# Patient Record
Sex: MALE | HISPANIC OR LATINO | Employment: FULL TIME | URBAN - METROPOLITAN AREA
[De-identification: names, ages, dates, MRNs, and addresses within clinical notes are randomized per-mention and may not be internally consistent; named-entity substitution may affect disease eponyms.]

---

## 2018-02-25 ENCOUNTER — HOSPITAL ENCOUNTER (INPATIENT)
Facility: MEDICAL CENTER | Age: 58
LOS: 2 days | DRG: 247 | End: 2018-02-27
Attending: EMERGENCY MEDICINE | Admitting: INTERNAL MEDICINE

## 2018-02-25 ENCOUNTER — APPOINTMENT (OUTPATIENT)
Dept: RADIOLOGY | Facility: MEDICAL CENTER | Age: 58
DRG: 247 | End: 2018-02-25
Attending: EMERGENCY MEDICINE

## 2018-02-25 ENCOUNTER — RESOLUTE PROFESSIONAL BILLING HOSPITAL PROF FEE (OUTPATIENT)
Dept: HOSPITALIST | Facility: MEDICAL CENTER | Age: 58
End: 2018-02-25

## 2018-02-25 DIAGNOSIS — I24.9 ACUTE CORONARY SYNDROME (HCC): ICD-10-CM

## 2018-02-25 PROBLEM — I21.4 NSTEMI (NON-ST ELEVATED MYOCARDIAL INFARCTION) (HCC): Status: ACTIVE | Noted: 2018-02-25

## 2018-02-25 LAB
ALBUMIN SERPL BCP-MCNC: 4.5 G/DL (ref 3.2–4.9)
ALBUMIN/GLOB SERPL: 1.7 G/DL
ALP SERPL-CCNC: 75 U/L (ref 30–99)
ALT SERPL-CCNC: 16 U/L (ref 2–50)
ANION GAP SERPL CALC-SCNC: 10 MMOL/L (ref 0–11.9)
APTT PPP: 25.2 SEC (ref 24.7–36)
AST SERPL-CCNC: 18 U/L (ref 12–45)
BASOPHILS # BLD AUTO: 0.5 % (ref 0–1.8)
BASOPHILS # BLD: 0.04 K/UL (ref 0–0.12)
BILIRUB SERPL-MCNC: 0.6 MG/DL (ref 0.1–1.5)
BNP SERPL-MCNC: 50 PG/ML (ref 0–100)
BUN SERPL-MCNC: 18 MG/DL (ref 8–22)
CALCIUM SERPL-MCNC: 9.4 MG/DL (ref 8.5–10.5)
CHLORIDE SERPL-SCNC: 105 MMOL/L (ref 96–112)
CO2 SERPL-SCNC: 23 MMOL/L (ref 20–33)
CREAT SERPL-MCNC: 1.26 MG/DL (ref 0.5–1.4)
EKG IMPRESSION: NORMAL
EKG IMPRESSION: NORMAL
EOSINOPHIL # BLD AUTO: 0.27 K/UL (ref 0–0.51)
EOSINOPHIL NFR BLD: 3.7 % (ref 0–6.9)
ERYTHROCYTE [DISTWIDTH] IN BLOOD BY AUTOMATED COUNT: 40 FL (ref 35.9–50)
GLOBULIN SER CALC-MCNC: 2.6 G/DL (ref 1.9–3.5)
GLUCOSE BLD-MCNC: 121 MG/DL (ref 65–99)
GLUCOSE SERPL-MCNC: 170 MG/DL (ref 65–99)
HCT VFR BLD AUTO: 47.2 % (ref 42–52)
HGB BLD-MCNC: 16.8 G/DL (ref 14–18)
IMM GRANULOCYTES # BLD AUTO: 0.06 K/UL (ref 0–0.11)
IMM GRANULOCYTES NFR BLD AUTO: 0.8 % (ref 0–0.9)
INR PPP: 0.95 (ref 0.87–1.13)
LIPASE SERPL-CCNC: 31 U/L (ref 11–82)
LYMPHOCYTES # BLD AUTO: 2.19 K/UL (ref 1–4.8)
LYMPHOCYTES NFR BLD: 29.6 % (ref 22–41)
MCH RBC QN AUTO: 29.5 PG (ref 27–33)
MCHC RBC AUTO-ENTMCNC: 35.6 G/DL (ref 33.7–35.3)
MCV RBC AUTO: 83 FL (ref 81.4–97.8)
MONOCYTES # BLD AUTO: 0.67 K/UL (ref 0–0.85)
MONOCYTES NFR BLD AUTO: 9.1 % (ref 0–13.4)
NEUTROPHILS # BLD AUTO: 4.16 K/UL (ref 1.82–7.42)
NEUTROPHILS NFR BLD: 56.3 % (ref 44–72)
NRBC # BLD AUTO: 0 K/UL
NRBC BLD-RTO: 0 /100 WBC
PLATELET # BLD AUTO: 274 K/UL (ref 164–446)
PMV BLD AUTO: 9.5 FL (ref 9–12.9)
POTASSIUM SERPL-SCNC: 3.6 MMOL/L (ref 3.6–5.5)
PROT SERPL-MCNC: 7.1 G/DL (ref 6–8.2)
PROTHROMBIN TIME: 12.4 SEC (ref 12–14.6)
RBC # BLD AUTO: 5.69 M/UL (ref 4.7–6.1)
SODIUM SERPL-SCNC: 138 MMOL/L (ref 135–145)
TROPONIN I SERPL-MCNC: 0.55 NG/ML (ref 0–0.04)
WBC # BLD AUTO: 7.4 K/UL (ref 4.8–10.8)

## 2018-02-25 PROCEDURE — 83690 ASSAY OF LIPASE: CPT

## 2018-02-25 PROCEDURE — 36415 COLL VENOUS BLD VENIPUNCTURE: CPT

## 2018-02-25 PROCEDURE — 85610 PROTHROMBIN TIME: CPT

## 2018-02-25 PROCEDURE — 71045 X-RAY EXAM CHEST 1 VIEW: CPT

## 2018-02-25 PROCEDURE — A9270 NON-COVERED ITEM OR SERVICE: HCPCS | Performed by: INTERNAL MEDICINE

## 2018-02-25 PROCEDURE — 99223 1ST HOSP IP/OBS HIGH 75: CPT | Performed by: INTERNAL MEDICINE

## 2018-02-25 PROCEDURE — 83880 ASSAY OF NATRIURETIC PEPTIDE: CPT

## 2018-02-25 PROCEDURE — 700111 HCHG RX REV CODE 636 W/ 250 OVERRIDE (IP): Performed by: EMERGENCY MEDICINE

## 2018-02-25 PROCEDURE — 700111 HCHG RX REV CODE 636 W/ 250 OVERRIDE (IP): Performed by: INTERNAL MEDICINE

## 2018-02-25 PROCEDURE — 700102 HCHG RX REV CODE 250 W/ 637 OVERRIDE(OP): Performed by: INTERNAL MEDICINE

## 2018-02-25 PROCEDURE — 84484 ASSAY OF TROPONIN QUANT: CPT

## 2018-02-25 PROCEDURE — 85025 COMPLETE CBC W/AUTO DIFF WBC: CPT

## 2018-02-25 PROCEDURE — A9270 NON-COVERED ITEM OR SERVICE: HCPCS | Performed by: EMERGENCY MEDICINE

## 2018-02-25 PROCEDURE — 93010 ELECTROCARDIOGRAM REPORT: CPT | Performed by: INTERNAL MEDICINE

## 2018-02-25 PROCEDURE — 82962 GLUCOSE BLOOD TEST: CPT

## 2018-02-25 PROCEDURE — 770020 HCHG ROOM/CARE - TELE (206)

## 2018-02-25 PROCEDURE — 93005 ELECTROCARDIOGRAM TRACING: CPT | Performed by: EMERGENCY MEDICINE

## 2018-02-25 PROCEDURE — 85730 THROMBOPLASTIN TIME PARTIAL: CPT

## 2018-02-25 PROCEDURE — 700102 HCHG RX REV CODE 250 W/ 637 OVERRIDE(OP): Performed by: EMERGENCY MEDICINE

## 2018-02-25 PROCEDURE — 99285 EMERGENCY DEPT VISIT HI MDM: CPT

## 2018-02-25 PROCEDURE — 96375 TX/PRO/DX INJ NEW DRUG ADDON: CPT

## 2018-02-25 PROCEDURE — 93005 ELECTROCARDIOGRAM TRACING: CPT

## 2018-02-25 PROCEDURE — 94760 N-INVAS EAR/PLS OXIMETRY 1: CPT

## 2018-02-25 PROCEDURE — 700105 HCHG RX REV CODE 258: Performed by: INTERNAL MEDICINE

## 2018-02-25 PROCEDURE — 83036 HEMOGLOBIN GLYCOSYLATED A1C: CPT

## 2018-02-25 PROCEDURE — 80053 COMPREHEN METABOLIC PANEL: CPT

## 2018-02-25 PROCEDURE — 93005 ELECTROCARDIOGRAM TRACING: CPT | Performed by: INTERNAL MEDICINE

## 2018-02-25 PROCEDURE — 96365 THER/PROPH/DIAG IV INF INIT: CPT

## 2018-02-25 RX ORDER — HEPARIN SODIUM 1000 [USP'U]/ML
2600 INJECTION, SOLUTION INTRAVENOUS; SUBCUTANEOUS PRN
Status: DISCONTINUED | OUTPATIENT
Start: 2018-02-25 | End: 2018-02-25

## 2018-02-25 RX ORDER — SODIUM CHLORIDE 9 MG/ML
INJECTION, SOLUTION INTRAVENOUS CONTINUOUS
Status: DISCONTINUED | OUTPATIENT
Start: 2018-02-25 | End: 2018-02-25

## 2018-02-25 RX ORDER — ASPIRIN 300 MG/1
300 SUPPOSITORY RECTAL ONCE
Status: COMPLETED | OUTPATIENT
Start: 2018-02-25 | End: 2018-02-25

## 2018-02-25 RX ORDER — ASPIRIN 325 MG
325 TABLET ORAL DAILY
Status: DISCONTINUED | OUTPATIENT
Start: 2018-02-26 | End: 2018-02-27

## 2018-02-25 RX ORDER — DEXTROSE MONOHYDRATE 25 G/50ML
25 INJECTION, SOLUTION INTRAVENOUS
Status: DISCONTINUED | OUTPATIENT
Start: 2018-02-25 | End: 2018-02-27 | Stop reason: HOSPADM

## 2018-02-25 RX ORDER — ACETAMINOPHEN 325 MG/1
650 TABLET ORAL EVERY 6 HOURS PRN
Status: DISCONTINUED | OUTPATIENT
Start: 2018-02-25 | End: 2018-02-27 | Stop reason: HOSPADM

## 2018-02-25 RX ORDER — ASPIRIN 81 MG/1
324 TABLET, CHEWABLE ORAL ONCE
Status: COMPLETED | OUTPATIENT
Start: 2018-02-25 | End: 2018-02-25

## 2018-02-25 RX ORDER — ASPIRIN 300 MG/1
300 SUPPOSITORY RECTAL DAILY
Status: DISCONTINUED | OUTPATIENT
Start: 2018-02-26 | End: 2018-02-27

## 2018-02-25 RX ORDER — ONDANSETRON 4 MG/1
4 TABLET, ORALLY DISINTEGRATING ORAL EVERY 4 HOURS PRN
Status: DISCONTINUED | OUTPATIENT
Start: 2018-02-25 | End: 2018-02-27 | Stop reason: HOSPADM

## 2018-02-25 RX ORDER — AMOXICILLIN 250 MG
2 CAPSULE ORAL 2 TIMES DAILY
Status: DISCONTINUED | OUTPATIENT
Start: 2018-02-25 | End: 2018-02-27 | Stop reason: HOSPADM

## 2018-02-25 RX ORDER — NITROGLYCERIN 0.4 MG/1
0.4 TABLET SUBLINGUAL
Status: DISCONTINUED | OUTPATIENT
Start: 2018-02-25 | End: 2018-02-27 | Stop reason: HOSPADM

## 2018-02-25 RX ORDER — LABETALOL HYDROCHLORIDE 5 MG/ML
10 INJECTION, SOLUTION INTRAVENOUS EVERY 4 HOURS PRN
Status: DISCONTINUED | OUTPATIENT
Start: 2018-02-25 | End: 2018-02-27 | Stop reason: HOSPADM

## 2018-02-25 RX ORDER — BISACODYL 10 MG
10 SUPPOSITORY, RECTAL RECTAL
Status: DISCONTINUED | OUTPATIENT
Start: 2018-02-25 | End: 2018-02-27 | Stop reason: HOSPADM

## 2018-02-25 RX ORDER — HEPARIN SODIUM 1000 [USP'U]/ML
2600 INJECTION, SOLUTION INTRAVENOUS; SUBCUTANEOUS PRN
Status: DISCONTINUED | OUTPATIENT
Start: 2018-02-25 | End: 2018-02-26

## 2018-02-25 RX ORDER — HEPARIN SODIUM 1000 [USP'U]/ML
5000 INJECTION, SOLUTION INTRAVENOUS; SUBCUTANEOUS ONCE
Status: COMPLETED | OUTPATIENT
Start: 2018-02-25 | End: 2018-02-25

## 2018-02-25 RX ORDER — ATORVASTATIN CALCIUM 80 MG/1
80 TABLET, FILM COATED ORAL
Status: DISCONTINUED | OUTPATIENT
Start: 2018-02-25 | End: 2018-02-27 | Stop reason: HOSPADM

## 2018-02-25 RX ORDER — SODIUM CHLORIDE 9 MG/ML
INJECTION, SOLUTION INTRAVENOUS CONTINUOUS
Status: DISCONTINUED | OUTPATIENT
Start: 2018-02-25 | End: 2018-02-26

## 2018-02-25 RX ORDER — ONDANSETRON 2 MG/ML
4 INJECTION INTRAMUSCULAR; INTRAVENOUS EVERY 4 HOURS PRN
Status: DISCONTINUED | OUTPATIENT
Start: 2018-02-25 | End: 2018-02-27 | Stop reason: HOSPADM

## 2018-02-25 RX ORDER — ATORVASTATIN CALCIUM 20 MG/1
80 TABLET, FILM COATED ORAL EVERY EVENING
Status: DISCONTINUED | OUTPATIENT
Start: 2018-02-25 | End: 2018-02-25

## 2018-02-25 RX ORDER — LISINOPRIL 5 MG/1
5 TABLET ORAL
Status: DISCONTINUED | OUTPATIENT
Start: 2018-02-25 | End: 2018-02-27 | Stop reason: HOSPADM

## 2018-02-25 RX ORDER — POLYETHYLENE GLYCOL 3350 17 G/17G
1 POWDER, FOR SOLUTION ORAL
Status: DISCONTINUED | OUTPATIENT
Start: 2018-02-25 | End: 2018-02-27 | Stop reason: HOSPADM

## 2018-02-25 RX ORDER — ASPIRIN 81 MG/1
324 TABLET, CHEWABLE ORAL DAILY
Status: DISCONTINUED | OUTPATIENT
Start: 2018-02-26 | End: 2018-02-27

## 2018-02-25 RX ORDER — MORPHINE SULFATE 4 MG/ML
2-4 INJECTION, SOLUTION INTRAMUSCULAR; INTRAVENOUS
Status: DISCONTINUED | OUTPATIENT
Start: 2018-02-25 | End: 2018-02-27 | Stop reason: HOSPADM

## 2018-02-25 RX ADMIN — METOPROLOL TARTRATE 25 MG: 25 TABLET, FILM COATED ORAL at 19:14

## 2018-02-25 RX ADMIN — ASPIRIN 324 MG: 81 TABLET, CHEWABLE ORAL at 16:58

## 2018-02-25 RX ADMIN — STANDARDIZED SENNA CONCENTRATE AND DOCUSATE SODIUM 2 TABLET: 8.6; 5 TABLET, FILM COATED ORAL at 21:57

## 2018-02-25 RX ADMIN — HEPARIN SODIUM 1050 UNITS: 5000 INJECTION, SOLUTION INTRAVENOUS at 17:13

## 2018-02-25 RX ADMIN — HEPARIN SODIUM 5000 UNITS: 1000 INJECTION, SOLUTION INTRAVENOUS; SUBCUTANEOUS at 17:13

## 2018-02-25 RX ADMIN — LISINOPRIL 5 MG: 5 TABLET ORAL at 19:14

## 2018-02-25 RX ADMIN — HEPARIN SODIUM 25000 UNITS: 5000 INJECTION, SOLUTION INTRAVENOUS at 19:16

## 2018-02-25 RX ADMIN — ATORVASTATIN CALCIUM 80 MG: 80 TABLET, FILM COATED ORAL at 21:57

## 2018-02-25 RX ADMIN — NITROGLYCERIN 1 INCH: 20 OINTMENT TOPICAL at 17:00

## 2018-02-25 ASSESSMENT — LIFESTYLE VARIABLES
AVERAGE NUMBER OF DAYS PER WEEK YOU HAVE A DRINK CONTAINING ALCOHOL: 2
TOTAL SCORE: 0
ON A TYPICAL DAY WHEN YOU DRINK ALCOHOL HOW MANY DRINKS DO YOU HAVE: 6
EVER HAD A DRINK FIRST THING IN THE MORNING TO STEADY YOUR NERVES TO GET RID OF A HANGOVER: NO
HAVE PEOPLE ANNOYED YOU BY CRITICIZING YOUR DRINKING: NO
HAVE YOU EVER FELT YOU SHOULD CUT DOWN ON YOUR DRINKING: NO
TOTAL SCORE: 0
TOTAL SCORE: 0
EVER_SMOKED: UNABLE TO EVALUATE AT THIS TIME - NEEDS ASSESSMENT PRIOR TO DISCHARGE
CONSUMPTION TOTAL: INCOMPLETE
EVER FELT BAD OR GUILTY ABOUT YOUR DRINKING: NO
EVER_SMOKED: NEVER
ALCOHOL_USE: YES

## 2018-02-25 ASSESSMENT — PAIN SCALES - GENERAL: PAINLEVEL_OUTOF10: 0

## 2018-02-25 ASSESSMENT — COPD QUESTIONNAIRES
HAVE YOU SMOKED AT LEAST 100 CIGARETTES IN YOUR ENTIRE LIFE: NO/DON'T KNOW
DURING THE PAST 4 WEEKS HOW MUCH DID YOU FEEL SHORT OF BREATH: NONE/LITTLE OF THE TIME
COPD SCREENING SCORE: 1
DO YOU EVER COUGH UP ANY MUCUS OR PHLEGM?: NO/ONLY WITH OCCASIONAL COLDS OR INFECTIONS

## 2018-02-25 ASSESSMENT — PATIENT HEALTH QUESTIONNAIRE - PHQ9
1. LITTLE INTEREST OR PLEASURE IN DOING THINGS: NOT AT ALL
6. FEELING BAD ABOUT YOURSELF - OR THAT YOU ARE A FAILURE OR HAVE LET YOURSELF OR YOUR FAMILY DOWN: NOT AL ALL
5. POOR APPETITE OR OVEREATING: NOT AT ALL
SUM OF ALL RESPONSES TO PHQ9 QUESTIONS 1 AND 2: 0
9. THOUGHTS THAT YOU WOULD BE BETTER OFF DEAD, OR OF HURTING YOURSELF: NOT AT ALL
4. FEELING TIRED OR HAVING LITTLE ENERGY: NOT AT ALL
SUM OF ALL RESPONSES TO PHQ QUESTIONS 1-9: 0
7. TROUBLE CONCENTRATING ON THINGS, SUCH AS READING THE NEWSPAPER OR WATCHING TELEVISION: NOT AT ALL
2. FEELING DOWN, DEPRESSED, IRRITABLE, OR HOPELESS: NOT AT ALL
3. TROUBLE FALLING OR STAYING ASLEEP OR SLEEPING TOO MUCH: NOT AT ALL
8. MOVING OR SPEAKING SO SLOWLY THAT OTHER PEOPLE COULD HAVE NOTICED. OR THE OPPOSITE, BEING SO FIGETY OR RESTLESS THAT YOU HAVE BEEN MOVING AROUND A LOT MORE THAN USUAL: NOT AT ALL

## 2018-02-26 ENCOUNTER — APPOINTMENT (OUTPATIENT)
Dept: RADIOLOGY | Facility: MEDICAL CENTER | Age: 58
DRG: 247 | End: 2018-02-26
Attending: INTERNAL MEDICINE

## 2018-02-26 PROBLEM — E11.9 DM2 (DIABETES MELLITUS, TYPE 2) (HCC): Status: ACTIVE | Noted: 2018-02-26

## 2018-02-26 PROBLEM — N28.9 RENAL INSUFFICIENCY: Status: ACTIVE | Noted: 2018-02-26

## 2018-02-26 PROBLEM — I10 HTN (HYPERTENSION): Status: ACTIVE | Noted: 2018-02-26

## 2018-02-26 PROBLEM — E78.5 HYPERLIPIDEMIA: Status: ACTIVE | Noted: 2018-02-26

## 2018-02-26 LAB
ACT BLD: 164 SEC (ref 74–137)
ACT BLD: 307 SEC (ref 74–137)
ALBUMIN SERPL BCP-MCNC: 4 G/DL (ref 3.2–4.9)
ALBUMIN/GLOB SERPL: 1.6 G/DL
ALP SERPL-CCNC: 75 U/L (ref 30–99)
ALT SERPL-CCNC: 17 U/L (ref 2–50)
AMPHET UR QL SCN: NEGATIVE
ANION GAP SERPL CALC-SCNC: 11 MMOL/L (ref 0–11.9)
APTT PPP: 49.3 SEC (ref 24.7–36)
APTT PPP: 55.7 SEC (ref 24.7–36)
APTT PPP: 87.5 SEC (ref 24.7–36)
AST SERPL-CCNC: 25 U/L (ref 12–45)
BARBITURATES UR QL SCN: NEGATIVE
BASOPHILS # BLD AUTO: 0.7 % (ref 0–1.8)
BASOPHILS # BLD: 0.05 K/UL (ref 0–0.12)
BENZODIAZ UR QL SCN: NEGATIVE
BILIRUB SERPL-MCNC: 0.6 MG/DL (ref 0.1–1.5)
BUN SERPL-MCNC: 17 MG/DL (ref 8–22)
BZE UR QL SCN: NEGATIVE
CALCIUM SERPL-MCNC: 8.9 MG/DL (ref 8.5–10.5)
CANNABINOIDS UR QL SCN: NEGATIVE
CHLORIDE SERPL-SCNC: 103 MMOL/L (ref 96–112)
CHOLEST SERPL-MCNC: 276 MG/DL (ref 100–199)
CO2 SERPL-SCNC: 24 MMOL/L (ref 20–33)
CREAT SERPL-MCNC: 0.83 MG/DL (ref 0.5–1.4)
EKG IMPRESSION: NORMAL
EKG IMPRESSION: NORMAL
EOSINOPHIL # BLD AUTO: 0.39 K/UL (ref 0–0.51)
EOSINOPHIL NFR BLD: 5.1 % (ref 0–6.9)
ERYTHROCYTE [DISTWIDTH] IN BLOOD BY AUTOMATED COUNT: 41.2 FL (ref 35.9–50)
EST. AVERAGE GLUCOSE BLD GHB EST-MCNC: 146 MG/DL
GLOBULIN SER CALC-MCNC: 2.5 G/DL (ref 1.9–3.5)
GLUCOSE BLD-MCNC: 137 MG/DL (ref 65–99)
GLUCOSE BLD-MCNC: 139 MG/DL (ref 65–99)
GLUCOSE BLD-MCNC: 145 MG/DL (ref 65–99)
GLUCOSE SERPL-MCNC: 145 MG/DL (ref 65–99)
HBA1C MFR BLD: 6.7 % (ref 0–5.6)
HCT VFR BLD AUTO: 44.7 % (ref 42–52)
HDLC SERPL-MCNC: 60 MG/DL
HGB BLD-MCNC: 15.5 G/DL (ref 14–18)
IMM GRANULOCYTES # BLD AUTO: 0.09 K/UL (ref 0–0.11)
IMM GRANULOCYTES NFR BLD AUTO: 1.2 % (ref 0–0.9)
INR PPP: 0.99 (ref 0.87–1.13)
LDLC SERPL CALC-MCNC: ABNORMAL MG/DL
LYMPHOCYTES # BLD AUTO: 2.84 K/UL (ref 1–4.8)
LYMPHOCYTES NFR BLD: 37.1 % (ref 22–41)
MAGNESIUM SERPL-MCNC: 2 MG/DL (ref 1.5–2.5)
MCH RBC QN AUTO: 29 PG (ref 27–33)
MCHC RBC AUTO-ENTMCNC: 34.7 G/DL (ref 33.7–35.3)
MCV RBC AUTO: 83.7 FL (ref 81.4–97.8)
METHADONE UR QL SCN: NEGATIVE
MONOCYTES # BLD AUTO: 0.73 K/UL (ref 0–0.85)
MONOCYTES NFR BLD AUTO: 9.5 % (ref 0–13.4)
NEUTROPHILS # BLD AUTO: 3.56 K/UL (ref 1.82–7.42)
NEUTROPHILS NFR BLD: 46.4 % (ref 44–72)
NRBC # BLD AUTO: 0 K/UL
NRBC BLD-RTO: 0 /100 WBC
OPIATES UR QL SCN: NEGATIVE
OXYCODONE UR QL SCN: NEGATIVE
PCP UR QL SCN: NEGATIVE
PHOSPHATE SERPL-MCNC: 3.6 MG/DL (ref 2.5–4.5)
PLATELET # BLD AUTO: 280 K/UL (ref 164–446)
PMV BLD AUTO: 9.2 FL (ref 9–12.9)
POTASSIUM SERPL-SCNC: 3.6 MMOL/L (ref 3.6–5.5)
PROPOXYPH UR QL SCN: NEGATIVE
PROT SERPL-MCNC: 6.5 G/DL (ref 6–8.2)
PROTHROMBIN TIME: 12.8 SEC (ref 12–14.6)
RBC # BLD AUTO: 5.34 M/UL (ref 4.7–6.1)
SODIUM SERPL-SCNC: 138 MMOL/L (ref 135–145)
TRIGL SERPL-MCNC: 457 MG/DL (ref 0–149)
TROPONIN I SERPL-MCNC: 3.06 NG/ML (ref 0–0.04)
TROPONIN I SERPL-MCNC: 3.06 NG/ML (ref 0–0.04)
TSH SERPL DL<=0.005 MIU/L-ACNC: 1.69 UIU/ML (ref 0.38–5.33)
WBC # BLD AUTO: 7.7 K/UL (ref 4.8–10.8)

## 2018-02-26 PROCEDURE — 700111 HCHG RX REV CODE 636 W/ 250 OVERRIDE (IP): Performed by: INTERNAL MEDICINE

## 2018-02-26 PROCEDURE — 85025 COMPLETE CBC W/AUTO DIFF WBC: CPT

## 2018-02-26 PROCEDURE — B2111ZZ FLUOROSCOPY OF MULTIPLE CORONARY ARTERIES USING LOW OSMOLAR CONTRAST: ICD-10-PCS | Performed by: INTERNAL MEDICINE

## 2018-02-26 PROCEDURE — 84484 ASSAY OF TROPONIN QUANT: CPT | Mod: 91

## 2018-02-26 PROCEDURE — 700117 HCHG RX CONTRAST REV CODE 255: Performed by: INTERNAL MEDICINE

## 2018-02-26 PROCEDURE — 80053 COMPREHEN METABOLIC PANEL: CPT

## 2018-02-26 PROCEDURE — 700102 HCHG RX REV CODE 250 W/ 637 OVERRIDE(OP)

## 2018-02-26 PROCEDURE — A9270 NON-COVERED ITEM OR SERVICE: HCPCS | Performed by: INTERNAL MEDICINE

## 2018-02-26 PROCEDURE — 93010 ELECTROCARDIOGRAM REPORT: CPT | Performed by: INTERNAL MEDICINE

## 2018-02-26 PROCEDURE — 82962 GLUCOSE BLOOD TEST: CPT

## 2018-02-26 PROCEDURE — 84443 ASSAY THYROID STIM HORMONE: CPT

## 2018-02-26 PROCEDURE — 360979 HCHG DIAGNOSTIC CATH

## 2018-02-26 PROCEDURE — 85610 PROTHROMBIN TIME: CPT

## 2018-02-26 PROCEDURE — B2151ZZ FLUOROSCOPY OF LEFT HEART USING LOW OSMOLAR CONTRAST: ICD-10-PCS | Performed by: INTERNAL MEDICINE

## 2018-02-26 PROCEDURE — C1725 CATH, TRANSLUMIN NON-LASER: HCPCS

## 2018-02-26 PROCEDURE — A9270 NON-COVERED ITEM OR SERVICE: HCPCS

## 2018-02-26 PROCEDURE — 80307 DRUG TEST PRSMV CHEM ANLYZR: CPT

## 2018-02-26 PROCEDURE — C9600 PERC DRUG-EL COR STENT SING: HCPCS | Mod: LD

## 2018-02-26 PROCEDURE — 304952 HCHG R 2 PADS

## 2018-02-26 PROCEDURE — 93458 L HRT ARTERY/VENTRICLE ANGIO: CPT

## 2018-02-26 PROCEDURE — C1874 STENT, COATED/COV W/DEL SYS: HCPCS

## 2018-02-26 PROCEDURE — 84100 ASSAY OF PHOSPHORUS: CPT

## 2018-02-26 PROCEDURE — 93306 TTE W/DOPPLER COMPLETE: CPT | Mod: 26 | Performed by: INTERNAL MEDICINE

## 2018-02-26 PROCEDURE — 36415 COLL VENOUS BLD VENIPUNCTURE: CPT

## 2018-02-26 PROCEDURE — C1894 INTRO/SHEATH, NON-LASER: HCPCS

## 2018-02-26 PROCEDURE — 307093 HCHG TR BAND RADIAL

## 2018-02-26 PROCEDURE — 99153 MOD SED SAME PHYS/QHP EA: CPT

## 2018-02-26 PROCEDURE — 85730 THROMBOPLASTIN TIME PARTIAL: CPT | Mod: 91

## 2018-02-26 PROCEDURE — 700102 HCHG RX REV CODE 250 W/ 637 OVERRIDE(OP): Performed by: INTERNAL MEDICINE

## 2018-02-26 PROCEDURE — C1887 CATHETER, GUIDING: HCPCS

## 2018-02-26 PROCEDURE — 700105 HCHG RX REV CODE 258: Performed by: INTERNAL MEDICINE

## 2018-02-26 PROCEDURE — 80061 LIPID PANEL: CPT

## 2018-02-26 PROCEDURE — 770020 HCHG ROOM/CARE - TELE (206)

## 2018-02-26 PROCEDURE — 83735 ASSAY OF MAGNESIUM: CPT

## 2018-02-26 PROCEDURE — 85347 COAGULATION TIME ACTIVATED: CPT | Mod: 91

## 2018-02-26 PROCEDURE — 93306 TTE W/DOPPLER COMPLETE: CPT

## 2018-02-26 PROCEDURE — 93005 ELECTROCARDIOGRAM TRACING: CPT | Performed by: INTERNAL MEDICINE

## 2018-02-26 PROCEDURE — 027034Z DILATION OF CORONARY ARTERY, ONE ARTERY WITH DRUG-ELUTING INTRALUMINAL DEVICE, PERCUTANEOUS APPROACH: ICD-10-PCS | Performed by: INTERNAL MEDICINE

## 2018-02-26 PROCEDURE — 99152 MOD SED SAME PHYS/QHP 5/>YRS: CPT

## 2018-02-26 PROCEDURE — 4A023N7 MEASUREMENT OF CARDIAC SAMPLING AND PRESSURE, LEFT HEART, PERCUTANEOUS APPROACH: ICD-10-PCS | Performed by: INTERNAL MEDICINE

## 2018-02-26 PROCEDURE — 700111 HCHG RX REV CODE 636 W/ 250 OVERRIDE (IP)

## 2018-02-26 PROCEDURE — 700101 HCHG RX REV CODE 250

## 2018-02-26 PROCEDURE — C1769 GUIDE WIRE: HCPCS

## 2018-02-26 PROCEDURE — 99233 SBSQ HOSP IP/OBS HIGH 50: CPT | Performed by: FAMILY MEDICINE

## 2018-02-26 PROCEDURE — 94760 N-INVAS EAR/PLS OXIMETRY 1: CPT

## 2018-02-26 RX ORDER — HEPARIN SODIUM,PORCINE 1000/ML
VIAL (ML) INJECTION
Status: COMPLETED
Start: 2018-02-26 | End: 2018-02-26

## 2018-02-26 RX ORDER — LIDOCAINE HYDROCHLORIDE 20 MG/ML
INJECTION, SOLUTION INFILTRATION; PERINEURAL
Status: COMPLETED
Start: 2018-02-26 | End: 2018-02-26

## 2018-02-26 RX ORDER — VERAPAMIL HYDROCHLORIDE 2.5 MG/ML
INJECTION, SOLUTION INTRAVENOUS
Status: COMPLETED
Start: 2018-02-26 | End: 2018-02-26

## 2018-02-26 RX ORDER — PRASUGREL 10 MG/1
60 TABLET, FILM COATED ORAL ONCE
Status: DISCONTINUED | OUTPATIENT
Start: 2018-02-26 | End: 2018-02-26

## 2018-02-26 RX ORDER — PRASUGREL 10 MG/1
10 TABLET, FILM COATED ORAL DAILY
Status: DISCONTINUED | OUTPATIENT
Start: 2018-02-27 | End: 2018-02-27

## 2018-02-26 RX ORDER — MIDAZOLAM HYDROCHLORIDE 1 MG/ML
INJECTION INTRAMUSCULAR; INTRAVENOUS
Status: COMPLETED
Start: 2018-02-26 | End: 2018-02-26

## 2018-02-26 RX ORDER — PRASUGREL 10 MG/1
TABLET, FILM COATED ORAL
Status: COMPLETED
Start: 2018-02-26 | End: 2018-02-26

## 2018-02-26 RX ORDER — SODIUM CHLORIDE 9 MG/ML
INJECTION, SOLUTION INTRAVENOUS CONTINUOUS
Status: DISCONTINUED | OUTPATIENT
Start: 2018-02-26 | End: 2018-02-27

## 2018-02-26 RX ADMIN — LISINOPRIL 5 MG: 5 TABLET ORAL at 07:59

## 2018-02-26 RX ADMIN — IOHEXOL 183 ML: 350 INJECTION, SOLUTION INTRAVENOUS at 15:06

## 2018-02-26 RX ADMIN — ATORVASTATIN CALCIUM 80 MG: 80 TABLET, FILM COATED ORAL at 20:37

## 2018-02-26 RX ADMIN — ENOXAPARIN SODIUM 40 MG: 100 INJECTION SUBCUTANEOUS at 17:18

## 2018-02-26 RX ADMIN — METOPROLOL TARTRATE 25 MG: 25 TABLET, FILM COATED ORAL at 20:37

## 2018-02-26 RX ADMIN — METOPROLOL TARTRATE 25 MG: 25 TABLET, FILM COATED ORAL at 08:00

## 2018-02-26 RX ADMIN — HEPARIN SODIUM 2600 UNITS: 1000 INJECTION, SOLUTION INTRAVENOUS; SUBCUTANEOUS at 09:30

## 2018-02-26 RX ADMIN — FENTANYL CITRATE 50 MCG: 50 INJECTION, SOLUTION INTRAMUSCULAR; INTRAVENOUS at 15:06

## 2018-02-26 RX ADMIN — VERAPAMIL HYDROCHLORIDE 5 MG: 2.5 INJECTION, SOLUTION INTRAVENOUS at 14:19

## 2018-02-26 RX ADMIN — HEPARIN SODIUM 2000 UNITS: 200 INJECTION, SOLUTION INTRAVENOUS at 14:20

## 2018-02-26 RX ADMIN — NITROGLYCERIN 10 ML: 20 INJECTION INTRAVENOUS at 14:19

## 2018-02-26 RX ADMIN — ASPIRIN 325 MG: 325 TABLET ORAL at 07:59

## 2018-02-26 RX ADMIN — LIDOCAINE HYDROCHLORIDE: 20 INJECTION, SOLUTION INFILTRATION; PERINEURAL at 14:19

## 2018-02-26 RX ADMIN — MIDAZOLAM 2 MG: 1 INJECTION INTRAMUSCULAR; INTRAVENOUS at 14:47

## 2018-02-26 RX ADMIN — Medication 60 MG: at 15:08

## 2018-02-26 RX ADMIN — HEPARIN SODIUM: 1000 INJECTION, SOLUTION INTRAVENOUS; SUBCUTANEOUS at 14:19

## 2018-02-26 RX ADMIN — SODIUM CHLORIDE: 9 INJECTION, SOLUTION INTRAVENOUS at 15:30

## 2018-02-26 ASSESSMENT — ENCOUNTER SYMPTOMS
COUGH: 0
CONSTIPATION: 0
WHEEZING: 0
NECK PAIN: 0
PALPITATIONS: 0
CHILLS: 0
BLOOD IN STOOL: 0
FALLS: 0
ORTHOPNEA: 0
CLAUDICATION: 0
DOUBLE VISION: 0
HEARTBURN: 0
DIZZINESS: 0
WEAKNESS: 0
DIARRHEA: 0
BLURRED VISION: 0
ABDOMINAL PAIN: 0
DEPRESSION: 0
HEADACHES: 0
DIAPHORESIS: 1
WEAKNESS: 1
VOMITING: 0
SHORTNESS OF BREATH: 0
FLANK PAIN: 0
NAUSEA: 0
SPUTUM PRODUCTION: 0
BACK PAIN: 0
HEMOPTYSIS: 0
PND: 0
SORE THROAT: 0
FEVER: 0
MYALGIAS: 0

## 2018-02-26 ASSESSMENT — PAIN SCALES - GENERAL
PAINLEVEL_OUTOF10: 0
PAINLEVEL_OUTOF10: 0

## 2018-02-26 NOTE — ASSESSMENT & PLAN NOTE
- s/p PCI/LAD ESTRELLA x1  - cont medical mgmt per Cards recs (ASA/Statin/BB/Effient)  - to f/u with Cards as outpt

## 2018-02-26 NOTE — CATH LAB
Immediate Post-Operative Note      PreOp Diagnosis:  NSTEMI    PostOp Diagnosis:   1. Proximal LAD 95%  2. Normal EF    Procedure(s) :  Coronary Angiography, Left Heart Catheterization, Left Ventriculography, PCI proximal LAD 3.5 x 24 mm ESTRELLA    Surgeon(s):  David Ortiz M.D.    Type of Anesthesia: Moderate Sedation    Specimen: None    Complications: None    Plan:  OMT medical therapy post PCI      David Ortiz M.D.  2/26/2018 3:05 PM

## 2018-02-26 NOTE — PROGRESS NOTES
Belkis from Lab called with critical result of Troponin 3.06 at 0130. Critical lab result read back to Belkis.   Dr. Garcia  notified of critical lab result at 0140.  Critical lab result read back by Dr. Garcia.     No new orders at this time.

## 2018-02-26 NOTE — PROGRESS NOTES
2 RN Skin Assessment    No signs of skin breakdown to ears, elbows, trunk, sacrum, bottom, or heels. Heels calloused and dry, and small healed scar to left scapula. All skin blanching, brisk cap refill.

## 2018-02-26 NOTE — PROGRESS NOTES
After Troponin resulting, pt was assessed and not experiencing any discomfort or distress. No changes in rhythm from time of admission to the unit. Pt re-educated about calling for any chest discomfort. Will continue to monitor.

## 2018-02-26 NOTE — PROGRESS NOTES
Report received, assumed care, assessment done. Pt aware of plan of care. Will monitor. Denies chest pain, denies SOB.  Family at bedside..

## 2018-02-26 NOTE — PROGRESS NOTES
[facility] Hospitalist Progress Note    Date of Service: 2018    Chief Complaint  57 y.o. male admitted 2018 with NSTEMI, Newly diagnosed? Hypertension, Diabetes and Hyperlipidemia    Interval Problem Update  NSTEMI - denies chest pain currently, for cardiac cath  HTN - controlled  Diabetes - controlled    Consultants/Specialty  Cardiology    Disposition  TBD        Review of Systems   Constitutional: Negative for chills, fever and malaise/fatigue.   HENT: Negative for hearing loss and sore throat.    Eyes: Negative for blurred vision.   Respiratory: Negative for cough, shortness of breath and wheezing.    Cardiovascular: Negative for chest pain and leg swelling.   Gastrointestinal: Negative for abdominal pain, diarrhea, heartburn, nausea and vomiting.   Genitourinary: Negative for dysuria.   Neurological: Negative for dizziness and weakness.      Physical Exam  Laboratory/Imaging   Hemodynamics  Temp (24hrs), Av.5 °C (97.7 °F), Min:35.7 °C (96.3 °F), Max:36.9 °C (98.5 °F)   Temperature: 36.4 °C (97.5 °F)  Pulse  Av.7  Min: 60  Max: 92 Heart Rate (Monitored): 89  Blood Pressure: 120/78, NIBP: 135/77      Respiratory      Respiration: 18, Pulse Oximetry: 92 %, O2 Daily Delivery Respiratory : Room Air with O2 Available     Work Of Breathing / Effort: Mild  RUL Breath Sounds: Diminished, RML Breath Sounds: Diminished, RLL Breath Sounds: Diminished, ISATU Breath Sounds: Diminished, LLL Breath Sounds: Diminished    Fluids    Intake/Output Summary (Last 24 hours) at 18 1212  Last data filed at 18 0800   Gross per 24 hour   Intake              590 ml   Output              200 ml   Net              390 ml       Nutrition  Orders Placed This Encounter   Procedures   • Diet NPO     Standing Status:   Standing     Number of Occurrences:   1     Order Specific Question:   Restrict to:     Answer:   Sips with Medications [3]   • Diet NPO after Midnight     Standing Status:   Standing     Number of  Occurrences:   8     Order Specific Question:   Restrict to:     Answer:   Sips with Medications [3]     Physical Exam   Constitutional: He is oriented to person, place, and time. He appears well-developed and well-nourished.   HENT:   Head: Normocephalic and atraumatic.   Eyes: Conjunctivae are normal. Pupils are equal, round, and reactive to light.   Neck: No tracheal deviation present. No thyromegaly present.   Cardiovascular: Normal rate and regular rhythm.    Pulmonary/Chest: Effort normal and breath sounds normal.   Abdominal: Soft. Bowel sounds are normal. He exhibits no distension. There is no tenderness.   Musculoskeletal: He exhibits no edema.   Lymphadenopathy:     He has no cervical adenopathy.   Neurological: He is alert and oriented to person, place, and time.   Skin: Skin is warm and dry.   Nursing note and vitals reviewed.      Recent Labs      02/25/18   1616  02/26/18   0031   WBC  7.4  7.7   RBC  5.69  5.34   HEMOGLOBIN  16.8  15.5   HEMATOCRIT  47.2  44.7   MCV  83.0  83.7   MCH  29.5  29.0   MCHC  35.6*  34.7   RDW  40.0  41.2   PLATELETCT  274  280   MPV  9.5  9.2     Recent Labs      02/25/18   1616  02/26/18   0031   SODIUM  138  138   POTASSIUM  3.6  3.6   CHLORIDE  105  103   CO2  23  24   GLUCOSE  170*  145*   BUN  18  17   CREATININE  1.26  0.83   CALCIUM  9.4  8.9     Recent Labs      02/25/18   1616  02/26/18   0149  02/26/18   0812   APTT  25.2  55.7*  49.3*   INR  0.95  0.99   --      Recent Labs      02/25/18   1616   BNPBTYPENAT  50     Recent Labs      02/26/18   0031   TRIGLYCERIDE  457*   HDL  60   LDL  see below          Assessment/Plan     * NSTEMI (non-ST elevated myocardial infarction) (CMS-HCC)- (present on admission)   Assessment & Plan    ASA, Lipitor, Heparin  For cardiac cath        Hyperlipidemia- (present on admission)   Assessment & Plan    Lipitor        DM2 (diabetes mellitus, type 2) (CMS-HCC)- (present on admission)   Assessment & Plan    Newly  diagnosed  SSI  Diabetes education        Renal insufficiency- (present on admission)   Assessment & Plan    IVF NS, follow bmp        HTN (hypertension)- (present on admission)   Assessment & Plan    Metoprolol, Lisinopril          Quality-Core Measures   Reviewed items::  EKG reviewed, Radiology images reviewed, Labs reviewed and Medications reviewed  Velez catheter::  No Velez  DVT prophylaxis pharmacological::  Heparin  Ulcer Prophylaxis::  No

## 2018-02-26 NOTE — ED PROVIDER NOTES
ED Provider Note    Scribed for Hardy Hernandez M.D. by Yuliet Mcmanus. 2/25/2018  4:31 PM    Primary care provider: None  Means of arrival: Walk-in  History obtained from: Patient  History limited by: None    CHIEF COMPLAINT  Chief Complaint   Patient presents with   • Chest Pain     off and on since Monday, today it lasted about an hour ago, denies pain currently       HPI  Preet Bustos is a 57 y.o. male who presents to the Emergency Department for evaluation of chest pain episodes. Patient reports he has had three episodes of chest pain. He states the first episode was five days ago. Patient describes this episodes are exacerbated by exertion. His second episode was three days ago and his last was today. He states his pain episode today lasted 10-15 minutes. Patient currently denies pain. Patient states the pain radiates to his shoulder and describes it as pressure. Patient endorses associated shortness of breath and diaphoresis. Patient denies leg pain and leg swelling. Patient denies seeing primary care in recent years. Patient denies smoking, history of diabetes, MI and high blood pressure. Denies family of myocardial infarction younger than 50 years of age.     REVIEW OF SYSTEMS  Pertinent positives include Chest pain, shortness of breath, diaphoresis. Pertinent negatives include no Leg pain, leg swelling.    All other systems reviewed and negative.  C.    PAST MEDICAL HISTORY  Patient denies history of diabetes, MI and high blood pressure.     SURGICAL HISTORY  patient denies any surgical history    SOCIAL HISTORY  Social History   Substance Use Topics   • Smoking status: Never Smoker   • Smokeless tobacco: Never Used   • Alcohol use Yes      Comment: occ      History   Drug Use No       FAMILY HISTORY  Patient denies family history of MI.    CURRENT MEDICATIONS  Home Medications     Reviewed by Joann Ascencio (Pharmacy Tech) on 02/25/18 at 1708  Med List Status: Complete   Medication Last Dose  Status        Patient Dameon Taking any Medications                       ALLERGIES  No Known Allergies    PHYSICAL EXAM  VITAL SIGNS: BP (!) 177/109   Pulse 92   Temp 36.7 °C (98.1 °F)   Resp 16   Wt 83.5 kg (184 lb 1.4 oz)   SpO2 95%     Constitutional: Well developed, Well nourished, No distress, Non-toxic appearance.   HENT: Normocephalic, Atraumatic, Bilateral external ears normal, Oropharynx moist, No oral exudates.   Eyes: PERRLA, EOMI, Conjunctiva normal, No discharge.   Neck: No tenderness, Supple, No stridor.   Lymphatic: No lymphadenopathy noted.   Cardiovascular: Normal heart rate, Normal rhythm.   Thorax & Lungs: Clear to auscultation bilaterally, No respiratory distress, No wheezing, No crackles.   Abdomen: Soft, No tenderness, No masses, No pulsatile masses.   Skin: Warm, Dry, No erythema, No rash.   Extremities:, No edema No cyanosis.   Musculoskeletal: No tenderness to palpation or major deformities noted.  Intact distal pulses  Neurologic: Awake, alert. Moves all extremities spontaneously.  Psychiatric: Affect normal, Judgment normal, Mood normal.     LABS  Results for orders placed or performed during the hospital encounter of 02/25/18   Troponin   Result Value Ref Range    Troponin I 0.55 (H) 0.00 - 0.04 ng/mL   Btype Natriuretic Peptide   Result Value Ref Range    B Natriuretic Peptide 50 0 - 100 pg/mL   CBC with Differential   Result Value Ref Range    WBC 7.4 4.8 - 10.8 K/uL    RBC 5.69 4.70 - 6.10 M/uL    Hemoglobin 16.8 14.0 - 18.0 g/dL    Hematocrit 47.2 42.0 - 52.0 %    MCV 83.0 81.4 - 97.8 fL    MCH 29.5 27.0 - 33.0 pg    MCHC 35.6 (H) 33.7 - 35.3 g/dL    RDW 40.0 35.9 - 50.0 fL    Platelet Count 274 164 - 446 K/uL    MPV 9.5 9.0 - 12.9 fL    Neutrophils-Polys 56.30 44.00 - 72.00 %    Lymphocytes 29.60 22.00 - 41.00 %    Monocytes 9.10 0.00 - 13.40 %    Eosinophils 3.70 0.00 - 6.90 %    Basophils 0.50 0.00 - 1.80 %    Immature Granulocytes 0.80 0.00 - 0.90 %    Nucleated RBC 0.00 /100  WBC    Neutrophils (Absolute) 4.16 1.82 - 7.42 K/uL    Lymphs (Absolute) 2.19 1.00 - 4.80 K/uL    Monos (Absolute) 0.67 0.00 - 0.85 K/uL    Eos (Absolute) 0.27 0.00 - 0.51 K/uL    Baso (Absolute) 0.04 0.00 - 0.12 K/uL    Immature Granulocytes (abs) 0.06 0.00 - 0.11 K/uL    NRBC (Absolute) 0.00 K/uL   Complete Metabolic Panel (CMP)   Result Value Ref Range    Sodium 138 135 - 145 mmol/L    Potassium 3.6 3.6 - 5.5 mmol/L    Chloride 105 96 - 112 mmol/L    Co2 23 20 - 33 mmol/L    Anion Gap 10.0 0.0 - 11.9    Glucose 170 (H) 65 - 99 mg/dL    Bun 18 8 - 22 mg/dL    Creatinine 1.26 0.50 - 1.40 mg/dL    Calcium 9.4 8.5 - 10.5 mg/dL    AST(SGOT) 18 12 - 45 U/L    ALT(SGPT) 16 2 - 50 U/L    Alkaline Phosphatase 75 30 - 99 U/L    Total Bilirubin 0.6 0.1 - 1.5 mg/dL    Albumin 4.5 3.2 - 4.9 g/dL    Total Protein 7.1 6.0 - 8.2 g/dL    Globulin 2.6 1.9 - 3.5 g/dL    A-G Ratio 1.7 g/dL   Prothrombin Time   Result Value Ref Range    PT 12.4 12.0 - 14.6 sec    INR 0.95 0.87 - 1.13   APTT   Result Value Ref Range    APTT 25.2 24.7 - 36.0 sec   Lipase   Result Value Ref Range    Lipase 31 11 - 82 U/L   ESTIMATED GFR   Result Value Ref Range    GFR If African American >60 >60 mL/min/1.73 m 2    GFR If Non African American 59 (A) >60 mL/min/1.73 m 2   EKG (NOW)   Result Value Ref Range    Report       [facility] Emergency Dept.    Test Date:  2018  Pt Name:    MARCO PAULA               Department: ER  MRN:        [id number]                      Room:  Gender:     Male                         Technician: [id number]  :        1960                   Requested By:ER TRIAGE PROTOCOL  Order #:    [id number]                    Reading MD: SANTA PORRAS MD    Measurements  Intervals                                Axis  Rate:       87                           P:          49  OR:         160                          QRS:        48  QRSD:       90                           T:          136  QT:          384  QTc:        462    Interpretive Statements  SINUS RHYTHM  BORDERLINE INFERIOR Q WAVES  REPOL ABNRM SUGGESTS ISCHEMIA, DIFFUSE LEADS  No previous ECG available for comparison    Electronically Signed On 2- 16:50:57 PST by SANTA PORRAS MD         RADIOLOGY  DX-CHEST-PORTABLE (1 VIEW)   Final Result      No evidence of acute cardiopulmonary process.      DX-CHEST-2 VIEWS    (Results Pending)   The radiologist's interpretation of all radiological studies have been reviewed by me.    COURSE & MEDICAL DECISION MAKING  Pertinent Labs & Imaging studies reviewed. (See chart for details)    4:31 PM - Patient seen and examined at bedside. Patient will be treated with  mg,  mg, Nitro-Bid 2%. Ordered DX Chest, Troponin, Btype Natriuretic peptide, CBC with differential, CMP, Prothrombin time, APTT, Lipase, Estimated GFR to evaluate his symptoms.    4:55 PM- I discussed with pharmacy treatment plan. The patient will also be treated with heparin injection 5,000 units.      4:56 PM- Paged cardiology    4:59 PM- I discussed the patient's case and the above findings with Dr. Abrams (Hospitalist) who agrees to see patient.    5:00 PM- Cardiology called. I discussed the patients case with Dr. Garcia (cardiology) who agrees to see patient bedside.     5:01 PM- Patient reevaluated at bedside. I discussed imaging and lab results with patient as shown above. I discussed plan of admission with patient. Patient understands and verbally agrees.     CRITICAL CARE  The very real possibilty of a deterioration of this patient's condition required the highest level of my preparedness for sudden, emergent intervention.  I provided critical care services, which included medication orders, frequent reevaluations of the patient's condition and response to treatment, ordering and reviewing test results, and discussing the case with various consultants.  The critical care time associated with the care of the patient was 35   minutes. Review chart for interventions. This time is exclusive of any other billable procedures.     Decision Making:  Patient would likely acute coronary syndrome, unstable angina, the grossly abnormal EKG with a positive troponin, given the patient aspirin, nitroglycerin, started a heparin drip, discussing case with cardiology, discussed the case with the hospitalist for admission to hospital.    CRITICAL CARE  The very real possibilty of a deterioration of this patient's condition required the highest level of my preparedness for sudden, emergent intervention.  I provided critical care services, which included medication orders, frequent reevaluations of the patient's condition and response to treatment, ordering and reviewing test results, and discussing the case with various consultants.  The critical care time associated with the care of the patient was 35  minutes. Review chart for interventions. This time is exclusive of any other billable procedures.     DISPOSITION:  Patient will be admitted to Dr. Abrams in guarded condition.    FINAL IMPRESSION  1. Acute coronary syndrome (CMS-[facility])    Critical Care Time of 35 Minutes.      IYuliet (Scribe), am scribing for, and in the presence of, Hardy Hernandez M.D..    Electronically signed by: Yuliet Mcmanus (Jessicaibadalberto), 2/25/2018    IHardy M.D. personally performed the services described in this documentation, as scribed by Yuliet Mcmanus in my presence, and it is both accurate and complete.    The note accurately reflects work and decisions made by me.  Hardy Hernandez  2/25/2018  8:11 PM

## 2018-02-26 NOTE — DISCHARGE PLANNING
Medical SW    SW wrote [facility] to come evaluate pt for Medicaid.    Plan: SW to f/u for insurance qualification.

## 2018-02-26 NOTE — PROGRESS NOTES
Patient received from er. rn gave report at bedside. Heparin gtt at 21 ml/hr. Labs to be drawn per order. No signs and symptoms of distress. No c/o cp or sob. Patient on room air. Patient orientated to facility. Family at the bedside. Lungs are clear. Normal breath sounds normal heart sounds. Patient on tele monitor reading SR heart rate 76. Bed low position. Call bell within reach.

## 2018-02-26 NOTE — PROGRESS NOTES
Lab called again about Troponin that is not resulting and an APTT that still needs to be drawn.      Call forwarded for Troponin that was drawn but not resulting, critical lab resulted.

## 2018-02-26 NOTE — CONSULTS
Cardiology Consult Note:    Kristian Garcia  Date & Time note created:    2/25/2018   6:12 PM     Referring MD:  Dr. Hernandez    Patient ID:   Name:             Preet Bustos   YOB: 1960  Age:                 57 y.o.  male   MRN:               [id number]                                                             Reason for Consult:      nSTEMI    History of Present Illness:    57-year-old male with no significant past medical history last 3 days been having exertional chest pain. Describes a pressure-like sensation with radiation to throat and rated 7 out of 10 in intensity.  It has been brought on by exertion and has been relieved in about 15 minutes of rest. She takes no medications and has no past medical history. His daughter made a cut in the ER where his ECG was significant for ST segment depression in leads V3 through V5. His initial troponin was positive at 0.55. His BNP was negative. He was started on aspirin and Nitropaste metoprolol and atorvastatin.    Review of Systems:      Constitutional: Denies fevers, Denies weight changes  Eyes: Denies changes in vision, no eye pain  Ears/Nose/Throat/Mouth: Denies nasal congestion or sore throat   Cardiovascular: + chest pain, no palpitations   Respiratory: no shortness of breath , Denies cough  Gastrointestinal/Hepatic: Denies abdominal pain, nausea, vomiting, diarrhea, constipation or GI bleeding   Genitourinary: Denies dysuria or frequency  Musculoskeletal/Rheum: Denies  joint pain and swelling, noedema  Skin: Denies rash  Neurological: Denies headache, confusion, memory loss or focal weakness/parasthesias  Psychiatric: denies mood disorder   Endocrine: Reny thyroid problems  Heme/Oncology/Lymph Nodes: Denies enlarged lymph nodes, denies brusing or known bleeding disorder  All other systems were reviewed and are negative (AMA/CMS criteria)                Past Medical History:   History reviewed. No pertinent past medical  history.  Active Hospital Problems    Diagnosis   • NSTEMI (non-ST elevated myocardial infarction) (CMS-HCC) [I21.4]       Past Surgical History:  History reviewed. No pertinent surgical history.    Hospital Medications:  Current Facility-Administered Medications   Medication Dose   • [START ON 2/26/2018] aspirin (ASA) tablet 325 mg  325 mg    Or   • [START ON 2/26/2018] aspirin (ASA) chewable tab 324 mg  324 mg    Or   • [START ON 2/26/2018] aspirin (ASA) suppository 300 mg  300 mg   • metoprolol (LOPRESSOR) tablet 25 mg  25 mg   • nitroglycerin (NITROSTAT) tablet 0.4 mg  0.4 mg   • morphine (pf) 4 mg/ml injection 2-4 mg  2-4 mg   • lisinopril (PRINIVIL) 10 MG tablet 5 mg  5 mg   • MD ALERT...HEPARIN WEIGHT BASED PROTOCOL Pharmacist to implement     • senna-docusate (PERICOLACE or SENOKOT S) 8.6-50 MG per tablet 2 Tab  2 Tab    And   • polyethylene glycol/lytes (MIRALAX) PACKET 1 Packet  1 Packet    And   • magnesium hydroxide (MILK OF MAGNESIA) suspension 30 mL  30 mL    And   • bisacodyl (DULCOLAX) suppository 10 mg  10 mg   • Respiratory Care per Protocol     • NS infusion     • labetalol (NORMODYNE,TRANDATE) injection 10 mg  10 mg   • ondansetron (ZOFRAN) syringe/vial injection 4 mg  4 mg   • ondansetron (ZOFRAN ODT) dispertab 4 mg  4 mg   • acetaminophen (TYLENOL) tablet 650 mg  650 mg   • insulin regular (HUMULIN R) injection 1-6 Units  1-6 Units    And   • glucose 4 g chewable tablet 16 g  16 g    And   • dextrose 50% (D50W) injection 25 mL  25 mL   • heparin injection 2,600 Units  2,600 Units    And   • heparin infusion 25,000 units in 500 ml 0.45% nacl     • atorvastatin (LIPITOR) tablet 80 mg  80 mg     No current outpatient prescriptions on file.         Current Outpatient Medications:    (Not in a hospital admission)    Medication Allergy:  No Known Allergies    Family History:  History reviewed. No pertinent family history.    Social History:  Social History     Social History   • Marital status: N/A  "    Spouse name: N/A   • Number of children: N/A   • Years of education: N/A     Occupational History   • Not on file.     Social History Main Topics   • Smoking status: Never Smoker   • Smokeless tobacco: Never Used   • Alcohol use Yes      Comment: occ   • Drug use: No   • Sexual activity: Not on file     Other Topics Concern   • Not on file     Social History Narrative   • No narrative on file         Physical Exam:  Vitals/ General Appearance:   Weight/BMI: Body mass index is 29.71 kg/m².  Blood pressure (!) 177/109, pulse 85, temperature 36.7 °C (98.1 °F), resp. rate (!) 23, height 1.676 m (5' 6\"), weight 83.5 kg (184 lb 1.4 oz), SpO2 93 %.  Vitals:    02/25/18 1612 02/25/18 1630 02/25/18 1658 02/25/18 1700   BP: (!) 177/109      Pulse: 92 79  85   Resp: 16 20  (!) 23   Temp: 36.7 °C (98.1 °F)      SpO2: 95% 94%  93%   Weight:       Height:   1.676 m (5' 6\")      Oxygen Therapy:  Pulse Oximetry: 93 %    Constitutional:   Well developed, Well nourished, No acute distress  HENMT:  Normocephalic, Atraumatic, Oropharynx moist mucous membranes, No oral exudates, Nose normal.  No thyromegaly.  Eyes:  EOMI, Conjunctiva normal, No discharge.  Neck:  Normal range of motion, No cervical tenderness,  no JVD.  Cardiovascular:  Normal heart rate, Normal rhythm, No murmurs, No rubs, No gallops.   Extremitites with intact distal pulses, no cyanosis, or edema.  Lungs:  Normal breath sounds, breath sounds clear to auscultation bilaterally,  no crackles, no wheezing.   Abdomen: Bowel sounds normal, Soft, No tenderness, No guarding, No rebound, No masses, No hepatosplenomegaly.  Skin: Warm, Dry, No erythema, No rash, no induration.  Neurologic: Alert & oriented x 3, No focal deficits noted, cranial nerves II through X are grossly intact.  Psychiatric: Affect normal, Judgment normal, Mood normal.      MDM (Data Review):     Records reviewed and summarized in current documentation    Lab Data Review:  Recent Results (from the past " 24 hour(s))   EKG (NOW)    Collection Time: 18  4:04 PM   Result Value Ref Range    Report       [facility] Emergency Dept.    Test Date:  2018  Pt Name:    MARCO PAULA               Department: ER  MRN:        [id number]                      Room:  Gender:     Male                         Technician: [id number]  :        1960                   Requested By:ER TRIAGE PROTOCOL  Order #:    [id number]                    Reading MD: SANTA PORRAS MD    Measurements  Intervals                                Axis  Rate:       87                           P:          49  TX:         160                          QRS:        48  QRSD:       90                           T:          136  QT:         384  QTc:        462    Interpretive Statements  SINUS RHYTHM  BORDERLINE INFERIOR Q WAVES  REPOL ABNRM SUGGESTS ISCHEMIA, DIFFUSE LEADS  No previous ECG available for comparison    Electronically Signed On 2018 16:50:57 PST by SANTA PORRAS MD     Troponin    Collection Time: 18  4:16 PM   Result Value Ref Range    Troponin I 0.55 (H) 0.00 - 0.04 ng/mL   Btype Natriuretic Peptide    Collection Time: 18  4:16 PM   Result Value Ref Range    B Natriuretic Peptide 50 0 - 100 pg/mL   CBC with Differential    Collection Time: 18  4:16 PM   Result Value Ref Range    WBC 7.4 4.8 - 10.8 K/uL    RBC 5.69 4.70 - 6.10 M/uL    Hemoglobin 16.8 14.0 - 18.0 g/dL    Hematocrit 47.2 42.0 - 52.0 %    MCV 83.0 81.4 - 97.8 fL    MCH 29.5 27.0 - 33.0 pg    MCHC 35.6 (H) 33.7 - 35.3 g/dL    RDW 40.0 35.9 - 50.0 fL    Platelet Count 274 164 - 446 K/uL    MPV 9.5 9.0 - 12.9 fL    Neutrophils-Polys 56.30 44.00 - 72.00 %    Lymphocytes 29.60 22.00 - 41.00 %    Monocytes 9.10 0.00 - 13.40 %    Eosinophils 3.70 0.00 - 6.90 %    Basophils 0.50 0.00 - 1.80 %    Immature Granulocytes 0.80 0.00 - 0.90 %    Nucleated RBC 0.00 /100 WBC    Neutrophils (Absolute) 4.16 1.82 - 7.42 K/uL    Lymphs  (Absolute) 2.19 1.00 - 4.80 K/uL    Monos (Absolute) 0.67 0.00 - 0.85 K/uL    Eos (Absolute) 0.27 0.00 - 0.51 K/uL    Baso (Absolute) 0.04 0.00 - 0.12 K/uL    Immature Granulocytes (abs) 0.06 0.00 - 0.11 K/uL    NRBC (Absolute) 0.00 K/uL   Complete Metabolic Panel (CMP)    Collection Time: 02/25/18  4:16 PM   Result Value Ref Range    Sodium 138 135 - 145 mmol/L    Potassium 3.6 3.6 - 5.5 mmol/L    Chloride 105 96 - 112 mmol/L    Co2 23 20 - 33 mmol/L    Anion Gap 10.0 0.0 - 11.9    Glucose 170 (H) 65 - 99 mg/dL    Bun 18 8 - 22 mg/dL    Creatinine 1.26 0.50 - 1.40 mg/dL    Calcium 9.4 8.5 - 10.5 mg/dL    AST(SGOT) 18 12 - 45 U/L    ALT(SGPT) 16 2 - 50 U/L    Alkaline Phosphatase 75 30 - 99 U/L    Total Bilirubin 0.6 0.1 - 1.5 mg/dL    Albumin 4.5 3.2 - 4.9 g/dL    Total Protein 7.1 6.0 - 8.2 g/dL    Globulin 2.6 1.9 - 3.5 g/dL    A-G Ratio 1.7 g/dL   Prothrombin Time    Collection Time: 02/25/18  4:16 PM   Result Value Ref Range    PT 12.4 12.0 - 14.6 sec    INR 0.95 0.87 - 1.13   APTT    Collection Time: 02/25/18  4:16 PM   Result Value Ref Range    APTT 25.2 24.7 - 36.0 sec   Lipase    Collection Time: 02/25/18  4:16 PM   Result Value Ref Range    Lipase 31 11 - 82 U/L   ESTIMATED GFR    Collection Time: 02/25/18  4:16 PM   Result Value Ref Range    GFR If African American >60 >60 mL/min/1.73 m 2    GFR If Non African American 59 (A) >60 mL/min/1.73 m 2       Imaging/Procedures Review:    Chest Xray:  Reviewed    EKG:   Personally reviewed by myself showing normal sinus rhythm with ST segment depression in leads V2 through V5.    ECHO:  N/A    MDM (Assessment and Plan):     Active Hospital Problems    Diagnosis   • NSTEMI (non-ST elevated myocardial infarction) (CMS-[facility]) [I21.4]     57-year-old male with ACS. He'll be started on metoprolol aspirin a heparin drip atorvastatin. He will be nothing by mouth from midnight for coronary angiogram in the morning.    Thank for you allowing me to take part in your  patient's care, please call should you have any questions or would like to discuss this patient.

## 2018-02-26 NOTE — PROGRESS NOTES
Lab called twice before 2330 about having to fix lab times and a Troponin that was not drawn on time. Told they would come as soon as possible.

## 2018-02-26 NOTE — PROGRESS NOTES
Throughout the shift Mr. Bustos has denied any and all chest pain. Resting comfortably at this time. Will continue to monitor.

## 2018-02-26 NOTE — ED TRIAGE NOTES
Ambulates to rm 10 after an EKG  Chief Complaint   Patient presents with   • Chest Pain     off and on since Monday, today it lasted about an hour ago, denies pain currently     Pt said the pain radiated down both arms, denied any SOB, but did have some diaphoresis with the pain today.  Family at the bedside, IV started, blood sent to lab.  BP elevated 177/109, denies hx of HTN.

## 2018-02-26 NOTE — H&P
[facility] Medicine History and Physical    Date of Service  02/25/2018    Chief Complaint  Chief Complaint   Patient presents with   • Chest Pain     off and on since Monday, today it lasted about an hour ago, denies pain currently       History of Presenting Illness  57 y.o. male who presented 2/25/2018 with chest pain. Patient has been having recurrent chest pain for the last 5 days. Initially it was 5 days ago with subsequently another episode 3 days ago and the last one today prior to presentation. Patient reports having had a prior history of hypertension but not on any medical therapy at this point in time. He did not establish with a physician. Otherwise healthy. Denies smoking. Father had history of stroke. Presents to the emergency room because of chest pain today. He reports the chest pain to be substernal. Pressure-like in character. 10 out of 10 in intensity. Radiating towards bilateral arms. No improving factors. Worsening with ambulation. Associated nausea and diaphoresis. No vomiting. Feeling weak with these symptoms. No other complaints.    Primary Care Physician  No primary care provider on file.    Consultants  Cardiology    Code Status  FULL CODE    Review of Systems  Review of Systems   Constitutional: Positive for diaphoresis and malaise/fatigue. Negative for chills and fever.   HENT: Negative for hearing loss and tinnitus.    Eyes: Negative for blurred vision and double vision.   Respiratory: Negative for cough, hemoptysis, sputum production, shortness of breath and wheezing.    Cardiovascular: Positive for chest pain. Negative for palpitations, orthopnea, claudication, leg swelling and PND.   Gastrointestinal: Negative for abdominal pain, blood in stool, constipation, diarrhea, heartburn, melena, nausea and vomiting.   Genitourinary: Negative for dysuria, flank pain, frequency, hematuria and urgency.   Musculoskeletal: Negative for back pain, falls, joint pain, myalgias and neck pain.   Skin:  Negative for itching and rash.   Neurological: Positive for weakness. Negative for dizziness and headaches.   Psychiatric/Behavioral: Negative for depression and suicidal ideas.        Past Medical History  HTN    Surgical History  Patient denies having had any surgeries     Medications  No current facility-administered medications on file prior to encounter.      No current outpatient prescriptions on file prior to encounter.       Family History  Father stroke  Mother DM    Social History  Social History   Substance Use Topics   • Smoking status: Never Smoker   • Smokeless tobacco: Never Used   • Alcohol use Yes      Comment: occ   No illicit drug use    Allergies  No Known Allergies     Physical Exam  Laboratory   Hemodynamics  Temp (24hrs), Av.4 °C (97.5 °F), Min:35.7 °C (96.3 °F), Max:36.8 °C (98.2 °F)   Temperature: 36.4 °C (97.5 °F)  Pulse  Av.3  Min: 60  Max: 92 Heart Rate (Monitored): 89  Blood Pressure: 131/79, NIBP: 135/77      Respiratory      Respiration: 18, Pulse Oximetry: 93 %, O2 Daily Delivery Respiratory : Room Air with O2 Available             Physical Exam   Constitutional: He is oriented to person, place, and time. He appears well-developed and well-nourished. No distress.   HENT:   Head: Normocephalic.   Mouth/Throat: Oropharynx is clear and moist. No oropharyngeal exudate.   Eyes: Conjunctivae and EOM are normal. Pupils are equal, round, and reactive to light. No scleral icterus.   Neck: No JVD present.   Cardiovascular: Normal rate, regular rhythm and normal heart sounds.  Exam reveals no gallop and no friction rub.    No murmur heard.  Pulmonary/Chest: No stridor. No respiratory distress. He has no wheezes. He has no rales.   Abdominal: Soft. Bowel sounds are normal. He exhibits no distension. There is no tenderness. There is no rebound and no guarding.   Musculoskeletal: He exhibits no edema, tenderness or deformity.   Neurological: He is alert and oriented to person, place, and  time. He has normal reflexes. No cranial nerve deficit.   Skin: Skin is warm and dry. He is not diaphoretic.   Psychiatric: He has a normal mood and affect. His behavior is normal. Judgment and thought content normal.       Recent Labs      02/25/18   1616   WBC  7.4   RBC  5.69   HEMOGLOBIN  16.8   HEMATOCRIT  47.2   MCV  83.0   MCH  29.5   MCHC  35.6*   RDW  40.0   PLATELETCT  274   MPV  9.5     Recent Labs      02/25/18   1616   SODIUM  138   POTASSIUM  3.6   CHLORIDE  105   CO2  23   GLUCOSE  170*   BUN  18   CREATININE  1.26   CALCIUM  9.4     Recent Labs      02/25/18   1616   ALTSGPT  16   ASTSGOT  18   ALKPHOSPHAT  75   TBILIRUBIN  0.6   LIPASE  31   GLUCOSE  170*     Recent Labs      02/25/18   1616   APTT  25.2   INR  0.95     Recent Labs      02/25/18   1616   BNPBTYPENAT  50         Lab Results   Component Value Date    TROPONINI 0.55 (H) 02/25/2018     Urinalysis:  No results found for: SPECGRAVITY, GLUCOSEUR, KETONES, NITRITE, WBCURINE, RBCURINE, BACTERIA, EPITHELCELL     Imaging  Reviewed   Assessment/Plan     I anticipate this patient will require at least two midnights for appropriate medical management, necessitating inpatient admission.    NSTEMI (non-ST elevated myocardial infarction) (CMS-[facility])- (present on admission)   Assessment & Plan    .   Need for further anti platelet therapy per cardiology team.   Atorvastatin 80 mg.   Metoprolol, ACE-I  Start Heparin drip  Cardiology team requested  ACS protocol  NPO after midnight for LHC in am tomorrow  Echocardiogram requested            VTE prophylaxis: IV heparin.

## 2018-02-27 ENCOUNTER — TELEPHONE (OUTPATIENT)
Dept: VASCULAR LAB | Facility: MEDICAL CENTER | Age: 58
End: 2018-02-27

## 2018-02-27 VITALS
WEIGHT: 177.91 LBS | RESPIRATION RATE: 16 BRPM | HEIGHT: 66 IN | DIASTOLIC BLOOD PRESSURE: 74 MMHG | SYSTOLIC BLOOD PRESSURE: 121 MMHG | OXYGEN SATURATION: 95 % | TEMPERATURE: 97.9 F | BODY MASS INDEX: 28.59 KG/M2 | HEART RATE: 65 BPM

## 2018-02-27 DIAGNOSIS — I21.4 NSTEMI (NON-ST ELEVATED MYOCARDIAL INFARCTION) (HCC): ICD-10-CM

## 2018-02-27 PROBLEM — N28.9 RENAL INSUFFICIENCY: Status: RESOLVED | Noted: 2018-02-26 | Resolved: 2018-02-27

## 2018-02-27 LAB
ANION GAP SERPL CALC-SCNC: 11 MMOL/L (ref 0–11.9)
APTT PPP: 27.8 SEC (ref 24.7–36)
APTT PPP: 29.7 SEC (ref 24.7–36)
BUN SERPL-MCNC: 13 MG/DL (ref 8–22)
CALCIUM SERPL-MCNC: 8.8 MG/DL (ref 8.5–10.5)
CHLORIDE SERPL-SCNC: 105 MMOL/L (ref 96–112)
CO2 SERPL-SCNC: 21 MMOL/L (ref 20–33)
CREAT SERPL-MCNC: 0.71 MG/DL (ref 0.5–1.4)
EKG IMPRESSION: NORMAL
ERYTHROCYTE [DISTWIDTH] IN BLOOD BY AUTOMATED COUNT: 40.3 FL (ref 35.9–50)
GLUCOSE BLD-MCNC: 120 MG/DL (ref 65–99)
GLUCOSE BLD-MCNC: 123 MG/DL (ref 65–99)
GLUCOSE SERPL-MCNC: 100 MG/DL (ref 65–99)
HCT VFR BLD AUTO: 42 % (ref 42–52)
HGB BLD-MCNC: 15.1 G/DL (ref 14–18)
LV EJECT FRACT  99904: 45
LV EJECT FRACT MOD 2C 99903: 39.52
LV EJECT FRACT MOD 4C 99902: 35.66
LV EJECT FRACT MOD BP 99901: 36.26
MCH RBC QN AUTO: 29.8 PG (ref 27–33)
MCHC RBC AUTO-ENTMCNC: 36 G/DL (ref 33.7–35.3)
MCV RBC AUTO: 82.8 FL (ref 81.4–97.8)
PLATELET # BLD AUTO: 254 K/UL (ref 164–446)
PMV BLD AUTO: 9.5 FL (ref 9–12.9)
POTASSIUM SERPL-SCNC: 3.5 MMOL/L (ref 3.6–5.5)
RBC # BLD AUTO: 5.07 M/UL (ref 4.7–6.1)
SODIUM SERPL-SCNC: 137 MMOL/L (ref 135–145)
TROPONIN I SERPL-MCNC: 2.34 NG/ML (ref 0–0.04)
TROPONIN I SERPL-MCNC: 3.13 NG/ML (ref 0–0.04)
WBC # BLD AUTO: 7.2 K/UL (ref 4.8–10.8)

## 2018-02-27 PROCEDURE — A9270 NON-COVERED ITEM OR SERVICE: HCPCS | Performed by: NURSE PRACTITIONER

## 2018-02-27 PROCEDURE — 36415 COLL VENOUS BLD VENIPUNCTURE: CPT

## 2018-02-27 PROCEDURE — 82962 GLUCOSE BLOOD TEST: CPT

## 2018-02-27 PROCEDURE — 700105 HCHG RX REV CODE 258: Performed by: INTERNAL MEDICINE

## 2018-02-27 PROCEDURE — 80048 BASIC METABOLIC PNL TOTAL CA: CPT

## 2018-02-27 PROCEDURE — 99239 HOSP IP/OBS DSCHRG MGMT >30: CPT | Performed by: INTERNAL MEDICINE

## 2018-02-27 PROCEDURE — 93010 ELECTROCARDIOGRAM REPORT: CPT | Performed by: INTERNAL MEDICINE

## 2018-02-27 PROCEDURE — 93005 ELECTROCARDIOGRAM TRACING: CPT | Performed by: INTERNAL MEDICINE

## 2018-02-27 PROCEDURE — A9270 NON-COVERED ITEM OR SERVICE: HCPCS | Performed by: INTERNAL MEDICINE

## 2018-02-27 PROCEDURE — 700102 HCHG RX REV CODE 250 W/ 637 OVERRIDE(OP): Performed by: INTERNAL MEDICINE

## 2018-02-27 PROCEDURE — 700111 HCHG RX REV CODE 636 W/ 250 OVERRIDE (IP): Performed by: INTERNAL MEDICINE

## 2018-02-27 PROCEDURE — 84484 ASSAY OF TROPONIN QUANT: CPT

## 2018-02-27 PROCEDURE — 85027 COMPLETE CBC AUTOMATED: CPT

## 2018-02-27 PROCEDURE — 85730 THROMBOPLASTIN TIME PARTIAL: CPT

## 2018-02-27 PROCEDURE — 700102 HCHG RX REV CODE 250 W/ 637 OVERRIDE(OP): Performed by: NURSE PRACTITIONER

## 2018-02-27 RX ORDER — PRASUGREL 10 MG/1
10 TABLET, FILM COATED ORAL DAILY
Qty: 90 TAB | Refills: 3 | Status: SHIPPED | OUTPATIENT
Start: 2018-02-28 | End: 2018-02-27

## 2018-02-27 RX ORDER — NITROGLYCERIN 0.4 MG/1
0.4 TABLET SUBLINGUAL PRN
Qty: 25 TAB | Refills: 0 | Status: SHIPPED | OUTPATIENT
Start: 2018-02-27

## 2018-02-27 RX ORDER — ASPIRIN 81 MG/1
81 TABLET ORAL DAILY
Qty: 30 TAB | Refills: 0 | Status: SHIPPED | OUTPATIENT
Start: 2018-02-28

## 2018-02-27 RX ORDER — LISINOPRIL 5 MG/1
5 TABLET ORAL DAILY
Qty: 30 TAB | Refills: 0 | Status: SHIPPED | OUTPATIENT
Start: 2018-02-28

## 2018-02-27 RX ORDER — POTASSIUM CHLORIDE 20 MEQ/1
40 TABLET, EXTENDED RELEASE ORAL ONCE
Status: COMPLETED | OUTPATIENT
Start: 2018-02-27 | End: 2018-02-27

## 2018-02-27 RX ORDER — ATORVASTATIN CALCIUM 80 MG/1
80 TABLET, FILM COATED ORAL
Qty: 30 TAB | Refills: 0 | Status: SHIPPED | OUTPATIENT
Start: 2018-02-27

## 2018-02-27 RX ADMIN — POTASSIUM CHLORIDE 40 MEQ: 1500 TABLET, EXTENDED RELEASE ORAL at 11:38

## 2018-02-27 RX ADMIN — METOPROLOL TARTRATE 25 MG: 25 TABLET, FILM COATED ORAL at 08:06

## 2018-02-27 RX ADMIN — ENOXAPARIN SODIUM 40 MG: 100 INJECTION SUBCUTANEOUS at 08:07

## 2018-02-27 RX ADMIN — ASPIRIN 325 MG: 325 TABLET ORAL at 08:06

## 2018-02-27 RX ADMIN — LISINOPRIL 5 MG: 5 TABLET ORAL at 08:06

## 2018-02-27 RX ADMIN — SODIUM CHLORIDE: 9 INJECTION, SOLUTION INTRAVENOUS at 00:24

## 2018-02-27 RX ADMIN — PRASUGREL HYDROCHLORIDE 10 MG: 10 TABLET, FILM COATED ORAL at 08:07

## 2018-02-27 ASSESSMENT — ENCOUNTER SYMPTOMS
COUGH: 0
MYALGIAS: 0
ORTHOPNEA: 0
ABDOMINAL PAIN: 0
CLAUDICATION: 0
DIZZINESS: 0
PND: 0
PALPITATIONS: 0
SHORTNESS OF BREATH: 0
FEVER: 0

## 2018-02-27 ASSESSMENT — PAIN SCALES - GENERAL
PAINLEVEL_OUTOF10: 0

## 2018-02-27 NOTE — PROGRESS NOTES
Pt resting in bed at this time. Even visible chest rise. No signs of distress. Bed alarm on. Call light in place.  Bed in low and locked position. Right radial cath site- CDI

## 2018-02-27 NOTE — DISCHARGE INSTRUCTIONS
Discharge Instructions    Discharged to home by car with relative. Discharged via walking, hospital escort: Refused.  Special equipment needed: Not Applicable    Be sure to schedule a follow-up appointment with your primary care doctor or any specialists as instructed.     Discharge Plan:   Pneumococcal Vaccine Given - only chart on this line when given:  (refused)  Influenza Vaccine Indication: Patient Refuses  Influenza Vaccine Given - only chart on this line when given:  (refused)    I understand that a diet low in cholesterol, fat, and sodium is recommended for good health. Unless I have been given specific instructions below for another diet, I accept this instruction as my diet prescription.   Other diet: cardiac    Special Instructions: Diagnosis:  Acute Coronary Syndrome (ACS) is a diagnosis that encompasses cardiac-related chest pain and heart attack. ACS occurs when the blood flow to the heart muscle is severely reduced or cut off completely due to a slow process called atherosclerosis.  Atherosclerosis is a disease in which the coronary arteries become narrow from a buildup of fat, cholesterol, and other substances that combine to form plaque. If the plaque breaks, a blood clot will form and block the blood flow to the heart muscle. This lack of blood flow can cause damage or death to the heart muscle which is called a heart attack or Myocardial Infarction (MI). There are two different types of MIs:  ST Elevation Myocardial Infarction or STEMI (the most severe type of heart attack) and Non-ST Elevation Myocardial Infarction or NSTEMI.    Treatment Plan:  · Cardiac Diet  - Low fat, low salt, low cholesterol   · Cardiac Rehab  - Your doctor has ordered you a referral to [facility].  Call [phone] to schedule an appointment.  · Attend my follow-up appointment with my Cardiologist.  · Take my medications as prescribed by my doctor  · Exercise daily  · Lower my bad cholesterol and raise my good  cholesterol    Medications:  Certain medications are used to treat ACS.  Remember to always take medications as prescribed and never stop talking medications unless told by your doctor.    You have been prescribed the following medicatons:    Beta-Blocker - Beta-Blocker metoprolol is used to lower blood pressure and heart rate, and/or helps your heart heal after a heart attack.    · Is patient discharged on Warfarin / Coumadin?   No     Depression / Suicide Risk    As you are discharged from this [facility] facility, it is important to learn how to keep safe from harming yourself.    Recognize the warning signs:  · Abrupt changes in personality, positive or negative- including increase in energy   · Giving away possessions  · Change in eating patterns- significant weight changes-  positive or negative  · Change in sleeping patterns- unable to sleep or sleeping all the time   · Unwillingness or inability to communicate  · Depression  · Unusual sadness, discouragement and loneliness  · Talk of wanting to die  · Neglect of personal appearance   · Rebelliousness- reckless behavior  · Withdrawal from people/activities they love  · Confusion- inability to concentrate     If you or a loved one observes any of these behaviors or has concerns about self-harm, here's what you can do:  · Talk about it- your feelings and reasons for harming yourself  · Remove any means that you might use to hurt yourself (examples: pills, rope, extension cords, firearm)  · Get professional help from the community (Mental Health, Substance Abuse, psychological counseling)  · Do not be alone:Call your Safe Contact- someone whom you trust who will be there for you.  · Call your local CRISIS HOTLINE 890-5771 or 992-557-8894  · Call your local Children's Mobile Crisis Response Team [place] (303) 746-2633 or www.[employer]  · Call the toll free National Suicide Prevention Hotlines   · National Suicide Prevention Lifeline 470-408-GUCY  (7238)  · [facility] 800-SUICIDE (646-2157)

## 2018-02-27 NOTE — PROCEDURES
DATE OF PROCEDURE:  02/26/2018    PROCEDURE:  Cardiac catheterization and Percutaneous Coronary Intervention.  A.  Left heart catheterization.  B.  Left ventriculography.  C.  Selective coronary angiography.  D. Coronary stent implantation to the proximal left anterior descending artery   with a 3.5x24 mm drug-eluting Synergy stent.  E. Right radial artery approach.  F. Conscious sedation supervision: 52 minutes.    PREPROCEDURE DIAGNOSIS:  Non-ST elevation myocardial infarction.    POST-PROCEDURE DIAGNOSES:  1.  Left anterior descending artery proximal 95% stenosis.  2.  Left ventricular ejection fraction  63%.    PHYSICIAN:  David Ortiz MD    REFERRING PHYSICIAN:  Adis Driscoll MD    COMPLICATIONS:  None.    MEDICATIONS:  1.  Versed 2 mg IV.  2.  Fentanyl 50 mcg IV.  3.  Lidocaine 2% subcutaneous.  4.  Heparin 2000 units IA.  5.  Heparin intravenous infusion, discontinued.  6.  Nitroglycerin 100 mcg IA.  7.  Verapamil 2.5 mg IA.  8.  Heparin 5000 units IV.  9.  Nitroglycerin 120 mcg intracoronary.  10.  Prasugrel 60 mg p.o.    INDICATIONS:  This is a 57-year-old male presents to [facility] on   02/25/2018 with acute coronary syndrome consistent with non-ST elevation   myocardial infarction.  The patient is undergoing a diagnostic cardiac   catheterization for post-MI risk stratification protocol evaluation.    PROCEDURE:  After an informed consent was obtained, the patient was brought to   the cardiac catheterization laboratory where he was prepped, draped and   anesthetized in the usual manner.    After establishing the presence of adequate collateral circulation to the   right hand with a pressure and oximetry-guided Leandro's test, the volar surface   of the right wrist was prepped, draped and anesthetized in the usual manner.    Using a modified Seldinger technique, a 6-Vshrbsn01 cm introducer sheath was   inserted in the right radial artery.  Heparin, verapamil, and nitroglycerin    were given via the side port.    Next, a 4-Macedonian JL 3.5 left coronary catheter was inserted in the ostium of   the left coronary artery and left coronary angiograms were obtained in various   projections.    Next, a 4-Macedonian JR 4.0 right coronary catheter was inserted in the ostium of the   right coronary artery and right coronary angiograms were obtained in various   projections.    The initial coronary angiograms demonstrated a high-grade proximal LAD with a  95% stenosis and was elected to proceed with coronary intervention of the   proximal LAD.    Next, a 6-Macedonian JL 3.0 left coronary guiding catheter was used to cannulate   the left coronary artery.    Next, a 0.014 Whisper wire was advanced into the distal left anterior   descending artery.    Next, a 2.5x15 mm balloon catheter was inserted across the proximal stenosis   and inflated to 14 atmospheres.  The balloon was removed.    Next, a 3.5x24 mm drug-eluting Synergy stent was placed across the residual   proximal LAD stenosis and deployed at 11 atmospheres.  The balloon was   removed.    Next, a 3.5x12 mm noncompliant balloon catheter was inserted and inflations of   15 and 16 atmospheres were performed.  The balloon and wire were removed and   final angiograms were performed.    Next, a 6-Macedonian pigtail catheter was inserted into the left ventricle under   fluoroscopic guidance.  A single plane CATALAN left ventricular angiogram was   performed.  Pre and post angiogram LVEDP, LV and aortic pressures were   obtained.    At the end the procedure, catheters were removed.  Hemostasis was achieved   with a wrist band device.  Patient tolerated the procedure well.    FINDINGS:  HEMODYNAMICS:  LEFT HEART PRESSURES:  1.  LVEDP is 20 mmHg.  2.  Left ventricular systolic pressure is 110 mmHg.  3.  Central aortic pressure systolic 102, diastolic 59, mean of 77 mmHg.    LEFT VENTRICULOGRAPHY:  Left ventricular chamber size is normal with mild   hypokinesis of the mid  anterior wall.  Calculated ejection fraction is 63%.    CORONARY ARTERIOGRAPHY:  1.  Left main artery:  Left main vessel is short, angiographically normal, and  bifurcates into the left anterior descending artery and circumflex artery.  2.  Left anterior descending artery:  The LAD is a moderately large caliber   vessel that gives rise to normal complement of septal and diagonal branches   and extends around the apex.  The proximal LAD has a 95% stenosis.  The   remainder of the vessel has mild diffuse nonobstructive atheromatous disease.  3.  Circumflex artery:  The circumflex is a moderately large caliber vessel,   gives rise to two  bifurcating marginal systems.  The circumflex artery has   mild-to-moderate distal vessel atheromatous disease.  3.  Right coronary artery:  The right coronary artery is slightly eccentric,   tortuous in the proximal segment gives rise to a small posterior descending   artery and posterolateral branch.    4.  The distal right coronary artery has a 50% stenosis.  The remainder of this   vessel has mild-to-moderate noncritical, nonobstructive coronary arterial disease.    POST-STENT IMPLANTATION of the proximal left anterior descending artery   with a 3.5x24 mm drug-eluting Synergy stent demonstrates good stent expansion,   0% residual stenosis with KOBE 3 antegrade flow.    PLAN:  Maximize optimal medical therapy for this patient's coronary artery   disease, post-MI, post-coronary intervention with a drug-eluting stent.       ____________________________________     MD RODOLFO MULLER / NTS    DD:  02/26/2018 16:59:09  DT:  02/26/2018 17:50:46    D#:  [id number]  Job#:  [id number]

## 2018-02-27 NOTE — CONSULTS
Diabetes education: Met with patient and family to discuss dx of type two diabetes. Discussed what diabetes was, difference between type two and pre diabetes, role of medications, what   his blood sugars have been. Pt is currently on regular sliding scale coverage ac and hs with blood sugars of 121, 137 and 139.   Discussed what effects blood sugars, stress management, exercise ( he works in [employer]), and need to eat three meals and hs snack with carbohydrates and proteins.  Plan: CDE will follow up tomorrow to continue education as he just came back from the cath lab. Questions answered. Please call [phone] if needs change.

## 2018-02-27 NOTE — PROGRESS NOTES
Assumed care of PT A&O 4. Pt resting in bed with no signs of labored breathing. On RA. Tele monitor in place, cardiac rhythm being monitored. Call light within reach, bed in lowest position, upper bed rails up, bed alarm on. Pt was updated on plan of care for the night . Will continue to monitor. Family at bedside. Hemoband on with slight bleeding noted. will remove at this time.

## 2018-02-27 NOTE — DISCHARGE SUMMARY
DISCHARGE SUMMARY     ADMIT DATE:  2/25/2018         DISCHARGE DATE:  2/27/2018    PATIENT ID:  Name: Preet Bustos   YOB: 1960  Age: 57 y.o. male   MRN: [id number]  Address: [facility] [place] [place] [address]  Phone: [phone] (home)    DISCHARGE DIAGNOSIS:  NSTEMI  Hypertension  New Diagnoses Diabetes Mellitus Type II  Dyslipidemia  Acute Kidney Injury    CONSULTANTS:  Cardiology    CONDITION:Stable    DISPOSITION:Home    DIET: ADA    ACTIVITY: As tolerated     HPI/HOSPITAL COURSE:  Please see H&P for details regarding initial hospital presentation.  In summary, 56yo M with PMHx of HTN was admitted for NSTEMI.  Cardiology was consulted and patient underwent PCI with LAD ESTRELLA placement (prox LAD 95% stenosis).  Cardiology subsequently started patient on Effient, then switched to Brilinta due to insurance issues to complete a 1 month course of treatment as outpatient before switching to Plavix.  Risk stratification was performed and patient was found to have A1c levels consistent with diagnosis of DM II as well as LDL levels consistent with DLD.  Patient was treated with SSI during hospital course and DM education was provided for patient prior to discharge.  Patient will be managing his DM II with dietary modifications as recommended in addition to PO Metformin.  Regarding DLD, patient was started on Atorvastatin and will continue upon discharge.  Patient had mild NAHOMY on admission which resolved with IVFs.  All other issues remained stable and is currently stable for discharge on medications listed below.       Physical exam:  Vitals:    02/26/18 2215 02/27/18 0000 02/27/18 0358 02/27/18 0800   BP: 119/74 127/80 116/73 121/74   Pulse: 66 68 63 65   Resp:  16 17 16   Temp:  36.3 °C (97.4 °F) 36.3 °C (97.3 °F) 36.6 °C (97.9 °F)   SpO2:  96% 98% 95%   Weight:       Height:         Weight/BMI: Body mass index is 28.72 kg/m².  Pulse Oximetry: 95 %, O2 (LPM): 0, O2 Delivery: None (Room Air)      Constitutional: He is oriented to person, place, and time. He appears well-developed and well-nourished.   HENT:   Head: Normocephalic and atraumatic.   Eyes: Conjunctivae are normal. Pupils are equal, round, and reactive to light.   Neck: No tracheal deviation present. No thyromegaly present.   Cardiovascular: Normal rate and regular rhythm.    Pulmonary/Chest: Effort normal and breath sounds normal.   Abdominal: Soft. Bowel sounds are normal. He exhibits no distension. There is no tenderness.   Musculoskeletal: He exhibits no edema.   Lymphadenopathy:     He has no cervical adenopathy.   Neurological: He is alert and oriented to person, place, and time.   Skin: Skin is warm and dry.     PROCEDURES:  Coronary Angiography, LHC, L-Ventriculography, PCI proximal LAD (2/26)    RADIOLOGY:  ECHOCARDIOGRAM COMP W/O CONT   Final Result      DX-CHEST-PORTABLE (1 VIEW)   Final Result      No evidence of acute cardiopulmonary process.          DISCHARGE LABS:    Recent Labs      02/25/18   1616 02/26/18   0031  02/27/18   0020   WBC  7.4  7.7  7.2   RBC  5.69  5.34  5.07   HEMOGLOBIN  16.8  15.5  15.1   HEMATOCRIT  47.2  44.7  42.0   MCV  83.0  83.7  82.8   MCH  29.5  29.0  29.8   RDW  40.0  41.2  40.3   PLATELETCT  274  280  254   MPV  9.5  9.2  9.5   NEUTSPOLYS  56.30  46.40   --    LYMPHOCYTES  29.60  37.10   --    MONOCYTES  9.10  9.50   --    EOSINOPHILS  3.70  5.10   --    BASOPHILS  0.50  0.70   --      No results found for: JNDXUYAL12, FOLATE, FERRITIN, IRON, TOTIRONBC    Estimated GFR/CRCL = Estimated Creatinine Clearance: 111.9 mL/min (by C-G formula based on SCr of 0.71 mg/dL).  Recent Labs      02/25/18   1616 02/26/18   0031  02/27/18   0020   SODIUM  138  138  137   POTASSIUM  3.6  3.6  3.5*   CHLORIDE  105  103  105   CO2  23  24  21   BUN  18  17  13   CREATININE  1.26  0.83  0.71   CALCIUM  9.4  8.9  8.8   MAGNESIUM   --   2.0   --    PHOSPHORUS   --   3.6   --    ALBUMIN  4.5  4.0   --        Recent  Labs      02/25/18   1616  02/26/18   0031  02/26/18   0149   ALTSGPT  16  17   --    ASTSGOT  18  25   --    ALKPHOSPHAT  75  75   --    TBILIRUBIN  0.6  0.6   --    LIPASE  31   --    --    ALBUMIN  4.5  4.0   --    GLOBULIN  2.6  2.5   --    INR  0.95   --   0.99       [facility](GLUCOSE:3,POCGLUCOSE:3)  )  Lab Results   Component Value Date    HBA1C 6.7 (H) 02/25/2018       DISCHARGE MEDICATIONS:  (X)  Medication Reconciliation Completed     Medication List      START taking these medications      Instructions   aspirin 81 MG EC tablet  Start taking on:  2/28/2018   Take 1 Tab by mouth every day.  Dose:  81 mg     atorvastatin 80 MG tablet  Commonly known as:  LIPITOR   Take 1 Tab by mouth every bedtime.  Dose:  80 mg     lisinopril 5 MG Tabs  Start taking on:  2/28/2018  Commonly known as:  PRINIVIL   Take 1 Tab by mouth every day.  Dose:  5 mg     metFORMIN 500 MG Tabs  Commonly known as:  GLUCOPHAGE   Take 1 Tab by mouth 2 times a day, with meals.  Dose:  500 mg     metoprolol 25 MG Tabs  Commonly known as:  LOPRESSOR   Take 1 Tab by mouth 2 Times a Day.  Dose:  25 mg     nitroglycerin 0.4 MG Subl  Commonly known as:  NITROSTAT   Place 1 Tab under tongue as needed for Chest Pain (up to 3 doses (if SBP greater than 90 mmHg)).  Dose:  0.4 mg     ticagrelor 90 MG Tabs tablet  Start taking on:  2/28/2018  Commonly known as:  BRILINTA   Take 1 Tab by mouth 2 Times a Day.  Dose:  90 mg              INSTRUCTIONS:   The patient was instructed to return to the ER in the event of worsening symptoms. I have counseled the patient on the importance of compliance and the patient has agreed to proceed with all medical recommendations and follow up plan indicated above.   The patient understands that all medications come with benefits and risks. Risks may include permanent injury or death and these risks can be minimized with close reassessment and monitoring.        Follow up appointment details :     F/U with Cardiology in  1-2 weeks  F/U with PCP in one week    PENDING STUDIES:  NONE    Time spent on discharge day patient visit, preparing discharge paperwork and arranging for patient follow up 44 mins.

## 2018-02-27 NOTE — DISCHARGE PLANNING
Upon utilization review, patient noted to be on the following medications that could potentially require prior authorization if prescribed at discharge: Brilinta.  If it is anticipated that patient will require these medications at discharge, beginning the prescription prior auth process in advance to anticipated discharge could assist in preventing delays when patient is medically cleared to be discharged from the hospital.

## 2018-02-27 NOTE — DISCHARGE PLANNING
Medical SW    SW [email or web address]. Pt Effient RX is 1100$. BRIAN let Leyda with Cardiology. Cardiology to switch pt to Brilinta and give samples.    Plan: As above.

## 2018-02-27 NOTE — TELEPHONE ENCOUNTER
[facility]    Delivered Brilinta 90 mg #60 and provided education.    Medhat Henley, PharmD

## 2018-02-27 NOTE — PROGRESS NOTES
Diabetes education: Met with pt and family briefly. Please see consult note.  Plan: CDE will follow up tomorrow to continue education as he just came back from the cath lab. Questions answered. Please call [phone] if needs change.

## 2018-02-27 NOTE — PROGRESS NOTES
Cardiology Progress Note               Author: Leyda RASCON Adolfo AMAYA Date & Time created: 2018  10:53 AM     Interval History:  57 year old male here for NSTEMI with exertional chest pain for the last 3 days. He was sent for left heart cath which revealed coronary artery disease. He received placement of a drug eluting stent to his proximal LAD.     : able to walk without angina, feels good    Telemetry: SBSR 59-/81    Chief Complaint:  Chest pain    Review of Systems   Constitutional: Negative for fever and malaise/fatigue.   Respiratory: Negative for cough and shortness of breath.    Cardiovascular: Negative for chest pain, palpitations, orthopnea, claudication, leg swelling and PND.   Gastrointestinal: Negative for abdominal pain.   Musculoskeletal: Negative for myalgias.   Neurological: Negative for dizziness.   All other systems reviewed and are negative.      Physical Exam   Constitutional: He is oriented to person, place, and time. He appears well-developed and well-nourished. No distress.   HENT:   Head: Normocephalic and atraumatic.   Eyes: EOM are normal.   Neck: Normal range of motion. No JVD present.   Cardiovascular: Normal rate, regular rhythm, normal heart sounds and intact distal pulses.    No murmur heard.  Pulmonary/Chest: Effort normal and breath sounds normal. No respiratory distress. He has no wheezes. He has no rales.   Abdominal: Soft. Bowel sounds are normal.   Musculoskeletal: Normal range of motion. He exhibits no edema.   Neurological: He is alert and oriented to person, place, and time.   Skin: Skin is warm and dry.   Psychiatric: He has a normal mood and affect.   Nursing note and vitals reviewed.      Hemodynamics:  Temp (24hrs), Av.4 °C (97.5 °F), Min:36.2 °C (97.2 °F), Max:36.6 °C (97.9 °F)  Temperature: 36.6 °C (97.9 °F)  Pulse  Av.7  Min: 60  Max: 92   Blood Pressure: 121/74     Respiratory:    Respiration: 16, Pulse Oximetry: 95 %     Work Of Breathing / Effort:  Mild  RUL Breath Sounds: Clear, RML Breath Sounds: Clear, RLL Breath Sounds: Diminished, ISATU Breath Sounds: Clear, LLL Breath Sounds: Diminished  Fluids:     Weight: 80.7 kg (177 lb 14.6 oz)  GI/Nutrition:  Orders Placed This Encounter   Procedures   • Diet Order     Standing Status:   Standing     Number of Occurrences:   1     Order Specific Question:   Diet:     Answer:   Cardiac [6]     Lab Results:  Recent Labs      02/25/18 1616 02/26/18   0031  02/27/18   0020   WBC  7.4  7.7  7.2   RBC  5.69  5.34  5.07   HEMOGLOBIN  16.8  15.5  15.1   HEMATOCRIT  47.2  44.7  42.0   MCV  83.0  83.7  82.8   MCH  29.5  29.0  29.8   MCHC  35.6*  34.7  36.0*   RDW  40.0  41.2  40.3   PLATELETCT  274  280  254   MPV  9.5  9.2  9.5     Recent Labs      02/25/18 1616 02/26/18   0031  02/27/18   0020   SODIUM  138  138  137   POTASSIUM  3.6  3.6  3.5*   CHLORIDE  105  103  105   CO2  23  24  21   GLUCOSE  170*  145*  100*   BUN  18  17  13   CREATININE  1.26  0.83  0.71   CALCIUM  9.4  8.9  8.8     Recent Labs      02/25/18 1616 02/26/18   0149   02/26/18   1133  02/27/18   0020  02/27/18   0526   APTT  25.2  55.7*   < >  87.5*  29.7  27.8   INR  0.95  0.99   --    --    --    --     < > = values in this interval not displayed.     Recent Labs      02/25/18 1616   BNPBTYPENAT  50     Recent Labs      02/25/18   1616 02/26/18   0626  02/27/18   0020  02/27/18   0629   TROPONINI  0.55*   < >  3.06*  2.34*  3.13*   BNPBTYPENAT  50   --    --    --    --     < > = values in this interval not displayed.     Recent Labs      02/26/18   0031   TRIGLYCERIDE  457*   HDL  60   LDL  see below         Medical Decision Making, by Problem:  Active Hospital Problems    Diagnosis   • *NSTEMI (non-ST elevated myocardial infarction) (CMS-HCC) [I21.4]   • HTN (hypertension) [I10]   • Renal insufficiency [N28.9]   • DM2 (diabetes mellitus, type 2) (CMS-HCC) [E11.9]   • Hyperlipidemia [E78.5]     Plan:  1. NSTEMI  -LHC revealed lesion in  prox LAD, ESTRELLA to LAD  -radial site CDI with no hematoma or decreased pulses  -echo good with mildly low EF, follow outpatient  -cont med management and lifestyle changes (discussed with patient)  -asa, change effient to brilinta (one month only) then transition to plavix due to no insurance. Will need plavix load with transition. Pharmacist to bring samples to bed now for one month.  -statin, bb, and ace on board  -no arythmias, no angina with ambulation  -follow up in 1-2 weeks in our office    2. HTN  -good control on bb, ace    3. DM  -primary team managing    4. HLD  -statin  -LDL goal <70 with CAD  -lipid and CMP in 6 weeks as outpatient    We will sign off and he is cleared for discharge.    Quality-Core Measures   Reviewed items::  EKG reviewed, Radiology images reviewed, Labs reviewed and Medications reviewed  Velez catheter::  No Velez  DVT prophylaxis pharmacological::  Not indicated at this time, ambulatory  DVT prophylaxis - mechanical:  Not indicated at this time, ambulatory  Ulcer Prophylaxis::  Not indicated

## 2018-02-28 ENCOUNTER — PATIENT OUTREACH (OUTPATIENT)
Dept: HEALTH INFORMATION MANAGEMENT | Facility: OTHER | Age: 58
End: 2018-02-28

## 2018-02-28 NOTE — PROGRESS NOTES
Discharge orders received. All lines and monitors discontinued. Reviewed discharge paperwork with patient. No questions at this time. Patient discharged via walking at 1720.

## 2018-02-28 NOTE — LETTER
Preet Bustos  [address] [place] [address]    February 28, 2018      Dear Preet Bustos,    [facility] wants to ensure your discharge home is safe and you or your loved ones have had all of your questions answered regarding your care after you leave the hospital.    Our discharge team was unsuccessful in our attempts to contact you telephonically and we wanted to be sure that you had a list of resources and contact information should you have any questions regarding your hospital stay, follow-up instructions, or active medical symptoms.    Questions or Concerns Regarding… Contact   Medical Questions Related to Your Discharge  (7 days a week, 8am-5pm) Contact a Nurse Care Coordinator   [phone]   Medical Questions Not Related to Your Discharge  (24 hours a day / 7 days a week)  Contact the Nurse Health Line   [phone]    Medications or Discharge Instructions Refer to your discharge packet   or contact your -[phone]   Follow-up Appointment(s) Schedule your appointment via [employer]   or contact Scheduling [phone]   Billing Review your statement via [employer]  or contact Billing [phone]   Medical Records Review your records via [employer]   or contact Medical Records [phone]     You can also easily access your medical information, test results and upcoming appointments via the [employer] free online health management tool. You can learn more and sign up at [employer]. For assistance setting up your [employer] account, please call [phone].    Once again, we want to ensure your discharge home is safe and that you have a clear understanding of any next steps in your care. If you have any questions or concerns, please do not hesitate to contact us, we are here for you. Thank you for choosing [facility] for your healthcare needs.    Sincerely,      Your [facility] Healthcare Team

## 2018-02-28 NOTE — PROGRESS NOTES
Diabetes education: Met with patient before discharge. Reviewed metformin, finger sticks, goals for blood sugars and need for follow up. Handouts given and questions answered.

## 2021-03-15 DIAGNOSIS — Z23 NEED FOR VACCINATION: ICD-10-CM

## 2023-05-09 NOTE — CARE PLAN
Problem: Safety  Goal: Will remain free from falls  Outcome: PROGRESSING AS EXPECTED  Pt mobility assessed at beginning of shift. Pt is standby assist. Fall precautions in place. Non-slip socks on. Bed in lowest locked position. Bed alarm on. Call light within reach. Pt educated to call for assistance and verbalizes understanding.    Problem: Knowledge Deficit  Goal: Knowledge of disease process/condition, treatment plan, diagnostic tests, and medications will improve  Outcome: PROGRESSING AS EXPECTED  Pt educated about disease process. Reason why medications are taken. And informed about treatment plan.        Show Text Field For Brand Names Of Contraception?: Yes